# Patient Record
Sex: MALE | Race: WHITE | Employment: FULL TIME | URBAN - METROPOLITAN AREA
[De-identification: names, ages, dates, MRNs, and addresses within clinical notes are randomized per-mention and may not be internally consistent; named-entity substitution may affect disease eponyms.]

---

## 2024-05-03 ENCOUNTER — APPOINTMENT (OUTPATIENT)
Dept: PRIMARY CARE | Facility: CLINIC | Age: 49
End: 2024-05-03
Payer: COMMERCIAL

## 2024-05-09 ENCOUNTER — OFFICE VISIT (OUTPATIENT)
Dept: PRIMARY CARE | Facility: CLINIC | Age: 49
End: 2024-05-09
Payer: COMMERCIAL

## 2024-05-09 ENCOUNTER — LAB (OUTPATIENT)
Dept: LAB | Facility: LAB | Age: 49
End: 2024-05-09
Payer: COMMERCIAL

## 2024-05-09 VITALS
RESPIRATION RATE: 16 BRPM | SYSTOLIC BLOOD PRESSURE: 113 MMHG | HEART RATE: 73 BPM | WEIGHT: 224 LBS | HEIGHT: 68 IN | OXYGEN SATURATION: 97 % | DIASTOLIC BLOOD PRESSURE: 73 MMHG | BODY MASS INDEX: 33.95 KG/M2

## 2024-05-09 DIAGNOSIS — R73.09 ELEVATED GLUCOSE: ICD-10-CM

## 2024-05-09 DIAGNOSIS — Z11.59 NEED FOR HEPATITIS C SCREENING TEST: ICD-10-CM

## 2024-05-09 DIAGNOSIS — E66.09 CLASS 1 OBESITY DUE TO EXCESS CALORIES WITHOUT SERIOUS COMORBIDITY WITH BODY MASS INDEX (BMI) OF 34.0 TO 34.9 IN ADULT: ICD-10-CM

## 2024-05-09 DIAGNOSIS — H91.90 DECREASED HEARING, UNSPECIFIED LATERALITY: ICD-10-CM

## 2024-05-09 DIAGNOSIS — Z12.5 PROSTATE CANCER SCREENING: ICD-10-CM

## 2024-05-09 DIAGNOSIS — Z00.00 ANNUAL PHYSICAL EXAM: Primary | ICD-10-CM

## 2024-05-09 DIAGNOSIS — Z12.11 COLON CANCER SCREENING: ICD-10-CM

## 2024-05-09 DIAGNOSIS — F17.219 CIGARETTE NICOTINE DEPENDENCE WITH NICOTINE-INDUCED DISORDER: ICD-10-CM

## 2024-05-09 DIAGNOSIS — Z00.00 ANNUAL PHYSICAL EXAM: ICD-10-CM

## 2024-05-09 PROBLEM — E66.811 CLASS 1 OBESITY DUE TO EXCESS CALORIES WITHOUT SERIOUS COMORBIDITY WITH BODY MASS INDEX (BMI) OF 34.0 TO 34.9 IN ADULT: Status: ACTIVE | Noted: 2024-05-09

## 2024-05-09 LAB
ALT SERPL W P-5'-P-CCNC: 41 U/L (ref 10–52)
ANION GAP SERPL CALC-SCNC: 16 MMOL/L (ref 10–20)
AST SERPL W P-5'-P-CCNC: 22 U/L (ref 9–39)
BUN SERPL-MCNC: 13 MG/DL (ref 6–23)
CALCIUM SERPL-MCNC: 9.2 MG/DL (ref 8.6–10.6)
CHLORIDE SERPL-SCNC: 106 MMOL/L (ref 98–107)
CHOLEST SERPL-MCNC: 231 MG/DL (ref 0–199)
CHOLESTEROL/HDL RATIO: 4.2
CO2 SERPL-SCNC: 24 MMOL/L (ref 21–32)
CREAT SERPL-MCNC: 0.93 MG/DL (ref 0.5–1.3)
EGFRCR SERPLBLD CKD-EPI 2021: >90 ML/MIN/1.73M*2
ERYTHROCYTE [DISTWIDTH] IN BLOOD BY AUTOMATED COUNT: 13.3 % (ref 11.5–14.5)
GLUCOSE SERPL-MCNC: 107 MG/DL (ref 74–99)
HCT VFR BLD AUTO: 43.6 % (ref 41–52)
HCV AB SER QL: NONREACTIVE
HDLC SERPL-MCNC: 55.5 MG/DL
HGB BLD-MCNC: 15.3 G/DL (ref 13.5–17.5)
LDLC SERPL CALC-MCNC: 161 MG/DL
MCH RBC QN AUTO: 30.8 PG (ref 26–34)
MCHC RBC AUTO-ENTMCNC: 35.1 G/DL (ref 32–36)
MCV RBC AUTO: 88 FL (ref 80–100)
NON HDL CHOLESTEROL: 176 MG/DL (ref 0–149)
NRBC BLD-RTO: 0 /100 WBCS (ref 0–0)
PLATELET # BLD AUTO: 136 X10*3/UL (ref 150–450)
POTASSIUM SERPL-SCNC: 4.4 MMOL/L (ref 3.5–5.3)
PSA SERPL-MCNC: 0.67 NG/ML
RBC # BLD AUTO: 4.97 X10*6/UL (ref 4.5–5.9)
SODIUM SERPL-SCNC: 142 MMOL/L (ref 136–145)
TRIGL SERPL-MCNC: 74 MG/DL (ref 0–149)
TSH SERPL-ACNC: 1.47 MIU/L (ref 0.44–3.98)
VLDL: 15 MG/DL (ref 0–40)
WBC # BLD AUTO: 6.7 X10*3/UL (ref 4.4–11.3)

## 2024-05-09 PROCEDURE — 84153 ASSAY OF PSA TOTAL: CPT

## 2024-05-09 PROCEDURE — 83036 HEMOGLOBIN GLYCOSYLATED A1C: CPT

## 2024-05-09 PROCEDURE — 80061 LIPID PANEL: CPT

## 2024-05-09 PROCEDURE — 84450 TRANSFERASE (AST) (SGOT): CPT

## 2024-05-09 PROCEDURE — 99386 PREV VISIT NEW AGE 40-64: CPT | Performed by: FAMILY MEDICINE

## 2024-05-09 PROCEDURE — 84460 ALANINE AMINO (ALT) (SGPT): CPT

## 2024-05-09 PROCEDURE — 3008F BODY MASS INDEX DOCD: CPT | Performed by: FAMILY MEDICINE

## 2024-05-09 PROCEDURE — 86803 HEPATITIS C AB TEST: CPT

## 2024-05-09 PROCEDURE — 80048 BASIC METABOLIC PNL TOTAL CA: CPT

## 2024-05-09 PROCEDURE — 1036F TOBACCO NON-USER: CPT | Performed by: FAMILY MEDICINE

## 2024-05-09 PROCEDURE — 36415 COLL VENOUS BLD VENIPUNCTURE: CPT

## 2024-05-09 PROCEDURE — 84443 ASSAY THYROID STIM HORMONE: CPT

## 2024-05-09 PROCEDURE — 85027 COMPLETE CBC AUTOMATED: CPT

## 2024-05-09 RX ORDER — ESOMEPRAZOLE MAGNESIUM 40 MG/1
20 GRANULE, DELAYED RELEASE ORAL
COMMUNITY

## 2024-05-09 ASSESSMENT — PATIENT HEALTH QUESTIONNAIRE - PHQ9
1. LITTLE INTEREST OR PLEASURE IN DOING THINGS: NOT AT ALL
SUM OF ALL RESPONSES TO PHQ9 QUESTIONS 1 AND 2: 0
2. FEELING DOWN, DEPRESSED OR HOPELESS: NOT AT ALL

## 2024-05-09 NOTE — PROGRESS NOTES
"Subjective   Patient ID: Jaime Garcia is a 49 y.o. male who presents for Annual Exam.    HPI   Initial visit.  Has not seen a doctor in 16 yrs.    Patient's health is described as good.  Regular dental visits: No.  Dental hygiene (brushing/flossing) regularly performed: Yes.  Corrective lenses: Yes.  Vision problems: No.  Last eye exam within 1 year: No.  Hearing loss: Yes.  Requests audiology referral: Yes.  Immunizations up to date: No (declined tetanus, shingrix).  Healthy diet: Yes.  Regular exercise: No.  Trying to lose weight: Yes.  Requests nutrition/weight loss referral: No.  Sexually active: Yes.  Using contraception: No.  Requests STD screening: No.  Colon cancer screening up to date: No.  Lung cancer screening up to date: No.  Hepatitis C screening up to date: No.    Review of Systems  Occ fatigue since he quit smoking last year.  Will make appt for eval if persists.    Objective   /73   Pulse 73   Resp 16   Ht 1.727 m (5' 8\")   Wt 102 kg (224 lb)   SpO2 97%   BMI 34.06 kg/m²     Physical Exam  Constitutional:       General: He is not in acute distress.     Appearance: He is obese.   HENT:      Head: Normocephalic.      Right Ear: Tympanic membrane normal.      Left Ear: Tympanic membrane normal.      Mouth/Throat:      Pharynx: Oropharynx is clear. No oropharyngeal exudate or posterior oropharyngeal erythema.   Eyes:      Extraocular Movements: Extraocular movements intact.      Conjunctiva/sclera: Conjunctivae normal.      Pupils: Pupils are equal, round, and reactive to light.   Neck:      Thyroid: No thyromegaly.      Vascular: No carotid bruit.   Cardiovascular:      Rate and Rhythm: Normal rate and regular rhythm.      Heart sounds: Normal heart sounds. No murmur heard.     No friction rub. No gallop.   Pulmonary:      Effort: Pulmonary effort is normal.      Breath sounds: Normal breath sounds. No wheezing, rhonchi or rales.   Abdominal:      General: Bowel sounds are normal. There is " no distension.      Palpations: Abdomen is soft. There is no mass.      Tenderness: There is no abdominal tenderness. There is no guarding or rebound.   Genitourinary:     Comments: Declined prostate exam  Lymphadenopathy:      Cervical: No cervical adenopathy.   Skin:     Coloration: Skin is not jaundiced or pale.   Neurological:      General: No focal deficit present.      Mental Status: He is oriented to person, place, and time.   Psychiatric:         Mood and Affect: Mood normal.         Behavior: Behavior normal.     Assessment/Plan   Diagnoses and all orders for this visit:  Annual physical exam  -     CBC; Future  -     Basic Metabolic Panel; Future  -     Lipid Panel; Future  -     Aspartate Aminotransferase; Future  -     Alanine Aminotransferase; Future  Prostate cancer screening  -     Prostate Specific Antigen, Screen; Future  Colon cancer screening  -     Colonoscopy Screening; Average Risk Patient; Future  Need for hepatitis C screening test  -     Hepatitis C Antibody; Future  Cigarette nicotine dependence with nicotine-induced disorder  -     CT lung screening low dose; Future  Decreased hearing, unspecified laterality  -     Referral to Audiology; Future  Class 1 obesity due to excess calories without serious comorbidity with body mass index (BMI) of 34.0 to 34.9 in adult  -     TSH with reflex to Free T4 if abnormal; Future    Fasting labs.  Colonoscopy referral.  CT chest for lung cancer screening.  Referred to audiology as requested.  Recommend routine dental visits.   Recommend weight loss efforts (see [email or web address] for ideas).     F/U 1 year: Annual wellness visit.

## 2024-05-09 NOTE — PATIENT INSTRUCTIONS
Fasting labs.  Colonoscopy referral.  CT chest for lung cancer screening.  Referred to audiology as requested.  Recommend routine dental visits.   Recommend weight loss efforts (see [email or web address] for ideas).     F/U 1 year: Annual wellness visit.    Lab services: [address]  Hours: M-F 6:30a-6p, Sat 8a-12p  Phone: [phone], [address]

## 2024-05-12 DIAGNOSIS — R73.09 ELEVATED GLUCOSE: Primary | ICD-10-CM

## 2024-05-12 LAB
EST. AVERAGE GLUCOSE BLD GHB EST-MCNC: 108 MG/DL
HBA1C MFR BLD: 5.4 %

## 2024-05-13 DIAGNOSIS — Z12.11 SPECIAL SCREENING FOR MALIGNANT NEOPLASMS, COLON: ICD-10-CM

## 2024-05-13 RX ORDER — POLYETHYLENE GLYCOL 3350, SODIUM SULFATE ANHYDROUS, SODIUM BICARBONATE, SODIUM CHLORIDE, POTASSIUM CHLORIDE 236; 22.74; 6.74; 5.86; 2.97 G/4L; G/4L; G/4L; G/4L; G/4L
POWDER, FOR SOLUTION ORAL
Qty: 4000 ML | Refills: 0 | Status: SHIPPED | OUTPATIENT
Start: 2024-05-13

## 2024-05-24 ENCOUNTER — HOSPITAL ENCOUNTER (OUTPATIENT)
Dept: RADIOLOGY | Facility: CLINIC | Age: 49
Discharge: HOME | End: 2024-05-24
Payer: COMMERCIAL

## 2024-05-24 DIAGNOSIS — F17.219 CIGARETTE NICOTINE DEPENDENCE WITH NICOTINE-INDUCED DISORDER: ICD-10-CM

## 2024-05-24 PROCEDURE — 71271 CT THORAX LUNG CANCER SCR C-: CPT

## 2024-05-26 DIAGNOSIS — D69.6 DECREASED PLATELET COUNT (CMS-HCC): Primary | ICD-10-CM

## 2024-05-27 DIAGNOSIS — F17.219 CIGARETTE NICOTINE DEPENDENCE WITH NICOTINE-INDUCED DISORDER: ICD-10-CM

## 2024-05-27 DIAGNOSIS — R91.8 PULMONARY NODULES: Primary | ICD-10-CM

## 2024-06-05 ENCOUNTER — LAB (OUTPATIENT)
Dept: LAB | Facility: LAB | Age: 49
End: 2024-06-05
Payer: COMMERCIAL

## 2024-06-05 DIAGNOSIS — D69.6 DECREASED PLATELET COUNT (CMS-HCC): ICD-10-CM

## 2024-06-05 LAB
ERYTHROCYTE [DISTWIDTH] IN BLOOD BY AUTOMATED COUNT: 13.5 % (ref 11.5–14.5)
HCT VFR BLD AUTO: 43.3 % (ref 41–52)
HGB BLD-MCNC: 15.1 G/DL (ref 13.5–17.5)
MCH RBC QN AUTO: 30.5 PG (ref 26–34)
MCHC RBC AUTO-ENTMCNC: 34.9 G/DL (ref 32–36)
MCV RBC AUTO: 88 FL (ref 80–100)
NRBC BLD-RTO: 0 /100 WBCS (ref 0–0)
PLATELET # BLD AUTO: 115 X10*3/UL (ref 150–450)
RBC # BLD AUTO: 4.95 X10*6/UL (ref 4.5–5.9)
WBC # BLD AUTO: 8 X10*3/UL (ref 4.4–11.3)

## 2024-06-05 PROCEDURE — 85027 COMPLETE CBC AUTOMATED: CPT

## 2024-06-05 PROCEDURE — 36415 COLL VENOUS BLD VENIPUNCTURE: CPT

## 2024-06-08 DIAGNOSIS — D69.6 PLATELETS DECREASED (CMS-HCC): Primary | ICD-10-CM

## 2024-06-26 NOTE — PROGRESS NOTES
AUDIOLOGY ADULT AUDIOMETRIC EVALUATION      Name:  Jaime Garcia   :  1975  Age:  49 y.o.  Date of Evaluation:  2024    Time: 3132-4922    IMPRESSIONS     Moderately-severe mixed hearing loss rising to mild in both ears.  Word understanding in quiet and in noise is excellent in both ears.  DPOAE responses absent at all frequencies tested in both ears.  Tympanometry indicates normal middle ear pressure and tympanic membrane mobility in both ears.     Today's test results are hearing loss requiring medical/otologic and audiologic follow-up.     Amplification needs: Need for amplification will be reassessed following medical intervention.    RECOMMENDATIONS     Continue medical follow up with primary care provider and/or Ears Nose and Throat (ENT) provider as recommended.  Schedule an evaluation with an Ears Nose and Throat (ENT) provider to address bilateral mixed hearing loss and left tinnitus. For ENT scheduling, call [phone].   Return for audiologic evaluation in conjunction with medical management or annually (whichever is sooner) to monitor hearing sensitivity and assess middle ear status or sooner should concerns arise. The audiology department can be reached at [phone] to schedule an appointment.   Consider amplification pending medical clearance. Check insurance benefit for hearing aid benefits and in-network providers. To schedule a hearing aid consultation with [facility] audiology department, call [phone]. Patient was provided with a copy of today's audiogram.  Avoid exposure to loud sounds by moving away from the noise, turning down the volume, or wearing proper hearing protection correctly.  Consider use of good communication strategies. These include but are not limited to the following: get Jaime's attention before speaking to him, close the distance between Jaime and who is speaking, limit background noise, allow Jaime access to visual cues (i.e. facial  "expressions/mouth movements, pictures, written instructions, etc.). When in situations where background noise cannot be avoided, position yourself so that the background noise is to your back, and you communication partner is seated in front of you, ideally with a quiet area or wall behind them.   Continue use of tinnitus management options, which include but are not limited to the following: sound therapy (use of pleasant or calming sounds that diminish the presence of tinnitus); mindfulness, meditation, and breathing exercises; sleep hygiene; mental health evaluation and formal therapies; psychiatric management of psychopharmaceuticals; dental/orthodontia evaluation; dietary changes (reduction of sodium, caffeine, alcohol); lifestyle changes (exercise, etc.); use of hearing protection and avoidance of loud noise; and formal tinnitus therapies (Tinnitus Retraining Therapy/ TRT, Progressive Tinnitus Management, Tinnitus Activities Treatment, Cognitive Behavioral Therapy).    HISTORY     Reason for visit:  Mr. Garcia is seen today for an initial audiologic evaluation at the request of Michele Sahu MD due to concerns for change in hearing in the left ear greater than the right ear. History obtained from patient report and chart review.     Change in Hearing: yes in the left ear greater than the right ear. This has been a chronic issue for many years.   Difficult listening environments: Significant difficulty with conversations to his left, especially when he is not paying attention and there is background noise.  Tinnitus: yes in the left ear constant, bothersome, non-pulsatile, and described as static/\"whooshing\" sensation  Otalgia: denied  Aural Pressure/Fullness: denied  Recent Ear Infections/Illness: denied  Otorrhea: denied  Dizziness: yes, history of drop attack about 14 years ago, and reported being diagnosed with vertigo in the ED, and followed up with providers regarding vertigo for about a year with no " "issues. No current/recent dizziness.   History of Ear Surgeries: denied  History of Noise Exposure: yes, occupational and recreational noise exposure, and hearing protection was reportedly not worn in the past, but is currently worn around excessive noise  Family History of Hearing Loss: Denied  Hearing Aid Use: None  Other Significant History: denied  Falls within the last year: denied    EVALUATION     See scanned audiogram in \"Media\"     TEST RESULTS     Otoscopic Evaluation:  Right Ear: Ear canal clear with identifiable cone of light.  Left Ear: Ear canal clear with identifiable cone of light.    Tympanometry (226 Hz):  Right Ear: Type A, middle ear pressure and tympanic membrane compliance within normal limits.   Left Ear: Type A, middle ear pressure and tympanic membrane compliance within normal limits.     Acoustic Reflexes:   Right Ear: Responses absent 500-2000 Hz.   Left Ear: Responses absent 500-2000 Hz.     Distortion Product Otoacoustic Emissions:  Right Ear: Absent at all frequencies tested 0259-1113 Hz.  Left Ear:  Absent at all frequencies tested 0694-3311 Hz.  Present OAEs suggest normal or near cochlear outer hair cell function for corresponding frequency region(s). Absent OAEs with normal middle ear function can be consistent with some degree of hearing loss. Assessment of cochlear outer hair cell function may be impacted by outer or middle ear function.    Test technique:  Pure Tone Audiometry via insert earphones  Reliability:   good    Pure Tone Audiometry:    Right Ear: Moderately-severe mixed hearing loss for 125-250 Hz, rising to moderate for 500-4000 Hz, and to mild for 1645-6345 Hz. Air bone gaps of 15-40 dB HL present for 250-4000 Hz.  Left Ear: Moderately-severe mixed hearing loss for 125-250 Hz, rising to moderate for 500-6000 Hz, and to mild at 8000 Hz. Air bone gaps of 20-30 dB HL present for 250-1000, and 4000 Hz.    Speech Audiometry:   Right Ear:  Speech Reception Threshold (SRT) " was obtained at 50 dB HL. This is in good agreement with three frequency Pure Tone Average. Word Recognition scores were excellent (96%) in quiet when words were presented at 85 dB HL. These results are based on [facility] Auditory Test No.6 (NU-6) (N=25).   Left Ear:  Speech Reception Threshold (SRT) was obtained at 50 dB HL. This is in good agreement with three frequency Pure Tone Average. Word Recognition scores were excellent (100%) in quiet when words were presented at 85 dB HL. These results are based on [facility] Auditory Test No.6 (NU-6) (N=25).   Speech in Noise:   Right Ear:  Quick-SIN tested was administered at 85 dB HL, and testing revealed a signal to noise ratio (SNR) loss of 0.5, which is categorized as normal/near normal (0-3 dB), and patient may hear better than those with normal hearing are able to in noise.  Left Ear:  Quick-SIN tested was administered at 85 dB HL, and testing revealed a signal to noise ratio (SNR) loss of 0.5, which is categorized as normal/near normal (0-3 dB), and patient may hear better than those with normal hearing are able to in noise.     Comparison of today's results with previous test results: No previous results available.         PATIENT EDUCATION     Discussed results and recommendations with Mr. Garcia. Questions were addressed and the patient was encouraged to contact our department at [phone] should concerns arise.     PETR Erickson, CCC-A  Licensed Clinical Audiologist    Degree of   Hearing Sensitivity dB Range   Within Normal Limits (WNL) 0 - 20   Slight 25   Mild 26 - 40   Moderate 41 - 55   Moderately-Severe 56 - 70   Severe 71 - 90   Profound 91 +     Key   CHL Conductive Hearing Loss   ECV Ear Canal Volume   HA Hearing Aid   NIHL Noise-Induced Hearing Loss   PTA Pure Tone Average   SNHL Sensorineural Hearing Loss   TM Tympanic Membrane   WNL Within Normal Limits

## 2024-06-28 ENCOUNTER — APPOINTMENT (OUTPATIENT)
Dept: GASTROENTEROLOGY | Facility: EXTERNAL LOCATION | Age: 49
End: 2024-06-28
Payer: COMMERCIAL

## 2024-06-30 ENCOUNTER — APPOINTMENT (OUTPATIENT)
Dept: RADIOLOGY | Facility: HOSPITAL | Age: 49
End: 2024-06-30
Payer: COMMERCIAL

## 2024-06-30 ENCOUNTER — HOSPITAL ENCOUNTER (EMERGENCY)
Facility: HOSPITAL | Age: 49
Discharge: HOME | End: 2024-06-30
Payer: COMMERCIAL

## 2024-06-30 VITALS
HEIGHT: 68 IN | HEART RATE: 69 BPM | RESPIRATION RATE: 18 BRPM | BODY MASS INDEX: 33.95 KG/M2 | SYSTOLIC BLOOD PRESSURE: 138 MMHG | DIASTOLIC BLOOD PRESSURE: 92 MMHG | WEIGHT: 224 LBS | TEMPERATURE: 97.3 F | OXYGEN SATURATION: 98 %

## 2024-06-30 DIAGNOSIS — L03.011 PARONYCHIA OF FINGER OF RIGHT HAND: Primary | ICD-10-CM

## 2024-06-30 PROCEDURE — 99283 EMERGENCY DEPT VISIT LOW MDM: CPT | Mod: 25

## 2024-06-30 PROCEDURE — 26010 DRAINAGE OF FINGER ABSCESS: CPT | Mod: F9

## 2024-06-30 PROCEDURE — 73140 X-RAY EXAM OF FINGER(S): CPT | Mod: RT

## 2024-06-30 PROCEDURE — 73140 X-RAY EXAM OF FINGER(S): CPT | Mod: RIGHT SIDE | Performed by: RADIOLOGY

## 2024-06-30 ASSESSMENT — COLUMBIA-SUICIDE SEVERITY RATING SCALE - C-SSRS
6. HAVE YOU EVER DONE ANYTHING, STARTED TO DO ANYTHING, OR PREPARED TO DO ANYTHING TO END YOUR LIFE?: NO
1. IN THE PAST MONTH, HAVE YOU WISHED YOU WERE DEAD OR WISHED YOU COULD GO TO SLEEP AND NOT WAKE UP?: NO
2. HAVE YOU ACTUALLY HAD ANY THOUGHTS OF KILLING YOURSELF?: NO

## 2024-06-30 ASSESSMENT — PAIN DESCRIPTION - PAIN TYPE: TYPE: ACUTE PAIN

## 2024-06-30 ASSESSMENT — PAIN DESCRIPTION - LOCATION: LOCATION: FINGER (COMMENT WHICH ONE)

## 2024-06-30 ASSESSMENT — PAIN SCALES - GENERAL: PAINLEVEL_OUTOF10: 5 - MODERATE PAIN

## 2024-06-30 ASSESSMENT — PAIN - FUNCTIONAL ASSESSMENT: PAIN_FUNCTIONAL_ASSESSMENT: 0-10

## 2024-07-01 ENCOUNTER — CLINICAL SUPPORT (OUTPATIENT)
Dept: AUDIOLOGY | Facility: CLINIC | Age: 49
End: 2024-07-01
Payer: COMMERCIAL

## 2024-07-01 DIAGNOSIS — H90.6 MIXED CONDUCTIVE AND SENSORINEURAL HEARING LOSS OF BOTH EARS: Primary | ICD-10-CM

## 2024-07-01 DIAGNOSIS — H91.93 DECREASED HEARING OF BOTH EARS: Primary | ICD-10-CM

## 2024-07-01 DIAGNOSIS — H93.12 SUBJECTIVE TINNITUS OF LEFT EAR: ICD-10-CM

## 2024-07-01 PROCEDURE — 92567 TYMPANOMETRY: CPT

## 2024-07-01 PROCEDURE — 92557 COMPREHENSIVE HEARING TEST: CPT

## 2024-07-01 NOTE — Clinical Note
Hello! This patient was seen for a hearing test at the request of his PCP. Results show bilateral mixed hearing loss, and asymmetric tinnitus (left ear only). Can he be scheduled with an ENT provider to address this? Thank you!!

## 2024-07-01 NOTE — PATIENT INSTRUCTIONS
IMPRESSIONS      Moderately-severe mixed hearing loss rising to mild in both ears.  Word understanding in quiet and in noise is excellent in both ears.  DPOAE responses absent at all frequencies tested in both ears.  Tympanometry indicates normal middle ear pressure and tympanic membrane mobility in both ears.      Today's test results are hearing loss requiring medical/otologic and audiologic follow-up.      Amplification needs: Need for amplification will be reassessed following medical intervention.     RECOMMENDATIONS      Continue medical follow up with primary care provider and/or Ears Nose and Throat (ENT) provider as recommended.  Schedule an evaluation with an Ears Nose and Throat (ENT) provider to address bilateral mixed hearing loss and left tinnitus. For ENT scheduling, call [phone].   Return for audiologic evaluation in conjunction with medical management or annually (whichever is sooner) to monitor hearing sensitivity and assess middle ear status or sooner should concerns arise. The audiology department can be reached at [phone] to schedule an appointment.   Consider amplification pending medical clearance. Check insurance benefit for hearing aid benefits and in-network providers. To schedule a hearing aid consultation with [facility] audiology department, call [phone]. Patient was provided with a copy of today's audiogram.  Avoid exposure to loud sounds by moving away from the noise, turning down the volume, or wearing proper hearing protection correctly.  Consider use of good communication strategies. These include but are not limited to the following: get Jaime's attention before speaking to him, close the distance between Jaime and who is speaking, limit background noise, allow Jaime access to visual cues (i.e. facial expressions/mouth movements, pictures, written instructions, etc.). When in situations where background noise cannot be avoided, position yourself so that  the background noise is to your back, and you communication partner is seated in front of you, ideally with a quiet area or wall behind them.   Continue use of tinnitus management options, which include but are not limited to the following: sound therapy (use of pleasant or calming sounds that diminish the presence of tinnitus); mindfulness, meditation, and breathing exercises; sleep hygiene; mental health evaluation and formal therapies; psychiatric management of psychopharmaceuticals; dental/orthodontia evaluation; dietary changes (reduction of sodium, caffeine, alcohol); lifestyle changes (exercise, etc.); use of hearing protection and avoidance of loud noise; and formal tinnitus therapies (Tinnitus Retraining Therapy/ TRT, Progressive Tinnitus Management, Tinnitus Activities Treatment, Cognitive Behavioral Therapy).

## 2024-07-01 NOTE — ED PROVIDER NOTES
HPI   Chief Complaint   Patient presents with    Wound Check       49y old male with a complaint of paronchia of the R hand. Has been ongoing for a few days was initially seen at urgent care placed on Keflex.  They did not I&D it because he has a history of low platelets and felt it would feel without I&D however it has gotten bigger and more painful.  The initial injury happened when it was stuck in a car door                          [place] Coma Scale Score: 15                     Patient History   No past medical history on file.  Past Surgical History:   Procedure Laterality Date    APPENDECTOMY       Family History   Problem Relation Name Age of Onset    Diverticulitis Father      Diabetes Paternal Grandmother       Social History     Tobacco Use    Smoking status: Former     Average packs/day: 0.8 packs/day for 32.2 years (24.1 ttl pk-yrs)     Types: Cigarettes     Start date: 1991    Smokeless tobacco: Never   Substance Use Topics    Alcohol use: Yes    Drug use: Yes     Types: Marijuana       Physical Exam   ED Triage Vitals [06/30/24 2029]   Temperature Heart Rate Respirations BP   36.3 °C (97.3 °F) 69 18 (!) 138/92      Pulse Ox Temp Source Heart Rate Source Patient Position   98 % Temporal Monitor Sitting      BP Location FiO2 (%)     Left arm --       Physical Exam  Vitals reviewed.   Constitutional:       General: He is not in acute distress.     Appearance: Normal appearance. He is normal weight. He is not ill-appearing, toxic-appearing or diaphoretic.   HENT:      Head: Normocephalic and atraumatic.      Right Ear: External ear normal.      Left Ear: External ear normal.      Nose: Nose normal.   Eyes:      Extraocular Movements: Extraocular movements intact.      Conjunctiva/sclera: Conjunctivae normal.      Pupils: Pupils are equal, round, and reactive to light.   Pulmonary:      Effort: Pulmonary effort is normal. No respiratory distress.      Breath sounds: No stridor.   Abdominal:      General:  There is no distension.   Musculoskeletal:         General: Tenderness present. No swelling or deformity.        Hands:    Skin:     Capillary Refill: Capillary refill takes less than 2 seconds.      Findings: No rash.   Neurological:      General: No focal deficit present.      Mental Status: He is alert and oriented to person, place, and time. Mental status is at baseline.   Psychiatric:         Mood and Affect: Mood normal.         Behavior: Behavior normal.         Thought Content: Thought content normal.         Judgment: Judgment normal.         ED Course & MDM   Diagnoses as of 06/30/24 2324   Paronychia of finger of right hand       Medical Decision Making  Differential of cellulitis versus paronychia versus felOn.    I&D performed after warm soaking encouraged to continue antibiotic    Referred for follow-up with hand return for any worse concerning symptoms            Procedure  Nail Care    Performed by: Alejandro Perez PA-C  Authorized by: Alejandro Perez PA-C    Consent:     Consent obtained:  Verbal    Consent given by:  Patient    Risks discussed:  Bleeding, pain, infection and permanent nail deformity    Alternatives discussed:  No treatment and referral  Universal protocol:     Procedure explained and questions answered to patient or proxy's satisfaction: yes      Immediately prior to procedure, a time out was called: yes      Patient identity confirmed:  Verbally with patient  Pre-procedure details:     Skin preparation:  Povidone-iodine  Procedure details:     Location:  Hand    Hand location:  R small finger  Anesthesia:     Anesthesia method:  None  Comments:      I&D performed with 11 blade scalpel significant purulent material drained patient tolerated well.       Alejandro Perez PA-C  06/30/24 5235       Alejandro Perez PA-C  06/30/24 9134

## 2024-07-09 ENCOUNTER — OFFICE VISIT (OUTPATIENT)
Dept: HEMATOLOGY/ONCOLOGY | Facility: CLINIC | Age: 49
End: 2024-07-09
Payer: COMMERCIAL

## 2024-07-09 VITALS
SYSTOLIC BLOOD PRESSURE: 119 MMHG | HEART RATE: 101 BPM | BODY MASS INDEX: 33.7 KG/M2 | RESPIRATION RATE: 16 BRPM | WEIGHT: 222.33 LBS | DIASTOLIC BLOOD PRESSURE: 70 MMHG | HEIGHT: 68 IN | TEMPERATURE: 97.6 F | OXYGEN SATURATION: 97 %

## 2024-07-09 DIAGNOSIS — D69.6 PLATELETS DECREASED (CMS-HCC): ICD-10-CM

## 2024-07-09 LAB
ALBUMIN SERPL BCP-MCNC: 4.4 G/DL (ref 3.4–5)
ALP SERPL-CCNC: 75 U/L (ref 33–120)
ALT SERPL W P-5'-P-CCNC: 45 U/L (ref 10–52)
ANION GAP SERPL CALC-SCNC: 14 MMOL/L (ref 10–20)
AST SERPL W P-5'-P-CCNC: 22 U/L (ref 9–39)
BASOPHILS # BLD AUTO: 0.03 X10*3/UL (ref 0–0.1)
BASOPHILS NFR BLD AUTO: 0.4 %
BILIRUB SERPL-MCNC: 0.5 MG/DL (ref 0–1.2)
BUN SERPL-MCNC: 14 MG/DL (ref 6–23)
CALCIUM SERPL-MCNC: 9.4 MG/DL (ref 8.6–10.3)
CHLORIDE SERPL-SCNC: 103 MMOL/L (ref 98–107)
CO2 SERPL-SCNC: 24 MMOL/L (ref 21–32)
CREAT SERPL-MCNC: 0.99 MG/DL (ref 0.5–1.3)
EGFRCR SERPLBLD CKD-EPI 2021: >90 ML/MIN/1.73M*2
EOSINOPHIL # BLD AUTO: 0.1 X10*3/UL (ref 0–0.7)
EOSINOPHIL NFR BLD AUTO: 1.3 %
ERYTHROCYTE [DISTWIDTH] IN BLOOD BY AUTOMATED COUNT: 12.8 % (ref 11.5–14.5)
GLUCOSE SERPL-MCNC: 167 MG/DL (ref 74–99)
HCT VFR BLD AUTO: 41.8 % (ref 41–52)
HGB BLD-MCNC: 14.9 G/DL (ref 13.5–17.5)
HIV 1+2 AB+HIV1 P24 AG SERPL QL IA: NONREACTIVE
IMM GRANULOCYTES # BLD AUTO: 0.03 X10*3/UL (ref 0–0.7)
IMM GRANULOCYTES NFR BLD AUTO: 0.4 % (ref 0–0.9)
LYMPHOCYTES # BLD AUTO: 0.87 X10*3/UL (ref 1.2–4.8)
LYMPHOCYTES NFR BLD AUTO: 10.9 %
MCH RBC QN AUTO: 30.2 PG (ref 26–34)
MCHC RBC AUTO-ENTMCNC: 35.6 G/DL (ref 32–36)
MCV RBC AUTO: 85 FL (ref 80–100)
MONOCYTES # BLD AUTO: 0.47 X10*3/UL (ref 0.1–1)
MONOCYTES NFR BLD AUTO: 5.9 %
NEUTROPHILS # BLD AUTO: 6.47 X10*3/UL (ref 1.2–7.7)
NEUTROPHILS NFR BLD AUTO: 81.1 %
NRBC BLD-RTO: 0 /100 WBCS (ref 0–0)
PLATELET # BLD AUTO: 140 X10*3/UL (ref 150–450)
POTASSIUM SERPL-SCNC: 3.9 MMOL/L (ref 3.5–5.3)
PROT SERPL-MCNC: 7.1 G/DL (ref 6.4–8.2)
RBC # BLD AUTO: 4.94 X10*6/UL (ref 4.5–5.9)
SODIUM SERPL-SCNC: 137 MMOL/L (ref 136–145)
WBC # BLD AUTO: 8 X10*3/UL (ref 4.4–11.3)

## 2024-07-09 PROCEDURE — 83921 ORGANIC ACID SINGLE QUANT: CPT | Performed by: INTERNAL MEDICINE

## 2024-07-09 PROCEDURE — 99204 OFFICE O/P NEW MOD 45 MIN: CPT | Performed by: INTERNAL MEDICINE

## 2024-07-09 PROCEDURE — 82746 ASSAY OF FOLIC ACID SERUM: CPT | Performed by: INTERNAL MEDICINE

## 2024-07-09 PROCEDURE — 83540 ASSAY OF IRON: CPT | Performed by: INTERNAL MEDICINE

## 2024-07-09 PROCEDURE — 1036F TOBACCO NON-USER: CPT | Performed by: INTERNAL MEDICINE

## 2024-07-09 PROCEDURE — 86706 HEP B SURFACE ANTIBODY: CPT | Performed by: INTERNAL MEDICINE

## 2024-07-09 PROCEDURE — 87389 HIV-1 AG W/HIV-1&-2 AB AG IA: CPT | Mod: WESLAB | Performed by: INTERNAL MEDICINE

## 2024-07-09 PROCEDURE — 86803 HEPATITIS C AB TEST: CPT | Performed by: INTERNAL MEDICINE

## 2024-07-09 PROCEDURE — 86704 HEP B CORE ANTIBODY TOTAL: CPT | Performed by: INTERNAL MEDICINE

## 2024-07-09 PROCEDURE — 82728 ASSAY OF FERRITIN: CPT | Performed by: INTERNAL MEDICINE

## 2024-07-09 PROCEDURE — 99214 OFFICE O/P EST MOD 30 MIN: CPT | Performed by: INTERNAL MEDICINE

## 2024-07-09 PROCEDURE — 82607 VITAMIN B-12: CPT | Performed by: INTERNAL MEDICINE

## 2024-07-09 PROCEDURE — 85025 COMPLETE CBC W/AUTO DIFF WBC: CPT | Performed by: INTERNAL MEDICINE

## 2024-07-09 PROCEDURE — 80053 COMPREHEN METABOLIC PANEL: CPT | Performed by: INTERNAL MEDICINE

## 2024-07-09 PROCEDURE — 3008F BODY MASS INDEX DOCD: CPT | Performed by: INTERNAL MEDICINE

## 2024-07-09 PROCEDURE — 87340 HEPATITIS B SURFACE AG IA: CPT | Performed by: INTERNAL MEDICINE

## 2024-07-09 ASSESSMENT — PATIENT HEALTH QUESTIONNAIRE - PHQ9
2. FEELING DOWN, DEPRESSED OR HOPELESS: NOT AT ALL
SUM OF ALL RESPONSES TO PHQ9 QUESTIONS 1 AND 2: 0
1. LITTLE INTEREST OR PLEASURE IN DOING THINGS: NOT AT ALL

## 2024-07-09 ASSESSMENT — ENCOUNTER SYMPTOMS
OCCASIONAL FEELINGS OF UNSTEADINESS: 0
LOSS OF SENSATION IN FEET: 0
DEPRESSION: 0

## 2024-07-09 ASSESSMENT — PAIN SCALES - GENERAL: PAINLEVEL: 0-NO PAIN

## 2024-07-09 NOTE — PATIENT INSTRUCTIONS
An abdominal ultrasound has been ordered. This can be done at [facility] or any other  location that would be most convenient for you.     Telehealth follow up visit in 4 weeks with Dr. Hernandez.

## 2024-07-09 NOTE — PROGRESS NOTES
Patient ID: Jaime Garcia is a 49 y.o. male.  Referring Physician: Michele Sahu MD  [address]  [place] [address]  Primary Care Provider: Michele Sahu MD  Referral Reason:     Subjective: thrombocytopenia      Heme/Onc History:  Has not seen MD for 16 years. Has 30 PY smoking history.    Had CT lung ca screening in 05/24: sub cnm nodule    Denies any bruising, bleeding. He drinks 12 or more beers 2-3 times weekly.        Past Medical History: No past medical history on file.  Social History:   Social History     Socioeconomic History    Marital status:      Spouse name: Not on file    Number of children: Not on file    Years of education: Not on file    Highest education level: Not on file   Occupational History    Not on file   Tobacco Use    Smoking status: Former     Average packs/day: 0.8 packs/day for 32.2 years (24.1 ttl pk-yrs)     Types: Cigarettes     Start date: 1991    Smokeless tobacco: Never   Vaping Use    Vaping status: Never Used   Substance and Sexual Activity    Alcohol use: Yes    Drug use: Yes     Types: Marijuana    Sexual activity: Not on file   Other Topics Concern    Not on file   Social History Narrative    Not on file     Social Determinants of Health     Financial Resource Strain: Not on file   Food Insecurity: Not on file   Transportation Needs: Not on file   Physical Activity: Not on file   Stress: Not on file   Social Connections: Not on file   Intimate Partner Violence: Not on file   Housing Stability: Not on file     Surgical History:   Past Surgical History:   Procedure Laterality Date    APPENDECTOMY       Family History:   Family History   Problem Relation Name Age of Onset    Diverticulitis Father      Diabetes Paternal Grandmother        reports that he has quit smoking. His smoking use included cigarettes. He started smoking about 33 years ago. He has a 24.1 pack-year smoking history. He has never used smokeless  "tobacco.  Oncology Family history: Cancer-related family history is not on file.    Review Of Systems:  As stated per in HPI; otherwise all other 12 point ROS are negative    Physical Exam:  /70 (BP Location: Left arm, Patient Position: Sitting, BP Cuff Size: Adult long)   Pulse 101   Temp 36.4 °C (97.6 °F) (Temporal)   Resp 16   Ht (S) 1.716 m (5' 7.56\")   Wt 101 kg (222 lb 5.3 oz)   SpO2 97%   BMI 34.25 kg/m²   BSA: 2.19 meters squared  General: awake/alert/oriented x3, no distress, alert and cooperative  Head: Short hair fully covering scalp. Symmetric facial expressions  Eyes: PERRL, EOMI, clear sclera, eyebrows present.  Ears/Nose/Mouth/Throat:  Oral mucous membranes moist. No oral ulcers. No palpable pre/post-auricular lymph nodes  Neck: No palpable cervical chain lymph nodes  Respiratory: unlabored breathing on room air, good chest expansion, thorax symmetric  Cardio: Regular rate and rhythm, normal S1 and S2, radial pulses symmetric  GI: Nondistended, soft, non-tender abdomen  Musculoskeletal: Normal muscle bulk and tone, ROM intact, no joint swelling.  Rises from chair and walks unassisted.  Extremities: No ankle swelling, no arm or leg wounds  Neuro: Alert, cognition intact, speech normal. Facial expressions symmetric.  No motor deficits noted. Sensation intact to touch and hot/cold.   Able to stand from seated position unassisted and walks around the room unassisted.  Psychological: Appropriate mood and behavior.  Skin: Warm and dry, no lesions, no rashes    Results:  Diagnostic Results   Lab Results   Component Value Date    WBC 8.0 06/05/2024    HGB 15.1 06/05/2024    HCT 43.3 06/05/2024    MCV 88 06/05/2024     (L) 06/05/2024     Lab Results   Component Value Date    CALCIUM 9.2 05/09/2024     05/09/2024    K 4.4 05/09/2024    CO2 24 05/09/2024     05/09/2024    BUN 13 05/09/2024    CREATININE 0.93 05/09/2024    ALT 41 05/09/2024    AST 22 05/09/2024       Current " Outpatient Medications:     esomeprazole (NexIUM) 40 mg packet, Take 20 mg by mouth once daily in the morning. Take before meals., Disp: , Rfl:     polyethylene glycol-electrolytes (Golytely) 420 gram solution, Begin drinking first half of prep 6pm the night before procedure. Start second half 5 hours before procedure time., Disp: 4000 mL, Rfl: 0     Radiology:    Pathology:    Assessment/Plan:  ? Thrombocytopenia:    He drinks 12 or more beers 2-3 times weekly. Abd US ordered to rule out HSM.    No other cytopenias. Likely benign.     2- Lung cancer screening: sub cnm nodule detected in 05/24. His first CT chest. Quit smoking a year ago.    Recommended to cut back on ETOH    Televisit in 4 weeks    Diagnoses and all orders for this visit:  Platelets decreased (CMS-HCC)  -     Referral to Hematology and Oncology       Performance Status: Asymptomatic    I spent more than 30 minutes for the patient today, including face-to-face conversation, pre-visit preparation, post-visit orders, and others.   David Guevara MD

## 2024-07-10 LAB
FERRITIN SERPL-MCNC: 127 NG/ML (ref 20–300)
FOLATE SERPL-MCNC: 10.4 NG/ML
HBV CORE AB SER QL: NONREACTIVE
HBV SURFACE AB SER-ACNC: <3.1 MIU/ML
HBV SURFACE AG SERPL QL IA: NONREACTIVE
HCV AB SER QL: NONREACTIVE
IRON SATN MFR SERPL: 25 % (ref 25–45)
IRON SERPL-MCNC: 93 UG/DL (ref 35–150)
TIBC SERPL-MCNC: 368 UG/DL (ref 240–445)
UIBC SERPL-MCNC: 275 UG/DL (ref 110–370)
VIT B12 SERPL-MCNC: 324 PG/ML (ref 211–911)

## 2024-07-14 LAB — METHYLMALONATE SERPL-SCNC: 0.22 UMOL/L (ref 0–0.4)

## 2024-07-15 ENCOUNTER — TELEPHONE (OUTPATIENT)
Dept: HEMATOLOGY/ONCOLOGY | Facility: CLINIC | Age: 49
End: 2024-07-15

## 2024-07-15 ENCOUNTER — OFFICE VISIT (OUTPATIENT)
Dept: ORTHOPEDIC SURGERY | Facility: CLINIC | Age: 49
End: 2024-07-15
Payer: COMMERCIAL

## 2024-07-15 DIAGNOSIS — L03.011 PARONYCHIA OF RIGHT LITTLE FINGER: Primary | ICD-10-CM

## 2024-07-15 PROCEDURE — 99204 OFFICE O/P NEW MOD 45 MIN: CPT | Performed by: ORTHOPAEDIC SURGERY

## 2024-07-15 PROCEDURE — 1036F TOBACCO NON-USER: CPT | Performed by: ORTHOPAEDIC SURGERY

## 2024-07-15 PROCEDURE — 99214 OFFICE O/P EST MOD 30 MIN: CPT | Performed by: ORTHOPAEDIC SURGERY

## 2024-07-15 RX ORDER — SULFAMETHOXAZOLE AND TRIMETHOPRIM 800; 160 MG/1; MG/1
1 TABLET ORAL 2 TIMES DAILY
Qty: 20 TABLET | Refills: 0 | Status: SHIPPED | OUTPATIENT
Start: 2024-07-15 | End: 2024-07-25

## 2024-07-15 NOTE — LETTER
July 27, 2024     Michele Sahu MD  [address] [facility], [address]  [facility] [address]    Patient: Jaime Garcia   YOB: 1975   Date of Visit: 7/15/2024       Dear Dr. Michele Sahu MD:    Thank you for referring Jaime Garcia to me for evaluation. Below are my notes for this consultation.  If you have questions, please do not hesitate to call me. I look forward to following your patient along with you.       Sincerely,     Leroy Yoder MD      CC: No Recipients  ______________________________________________________________________________________    CHIEF COMPLAINT         Right small finger pain    ASSESSMENT + PLAN    Right small finger paronychia, now cellulitis    I reviewed the nature of the condition and the expected time course of symptoms resolution from here.  The persistent and worsening redness is concerning.  I do not appreciate any reaccumulation of pus.  We discussed options for management and agreed on a course of Bactrim for 10 days.  Take these as directed until they are gone.  Watch for the warning signs of recurrent or worsening infection that I reviewed, and contact my office if you should notice any of these.  The prescription was transmitted to your pharmacy of choice.    Follow-up with me with any additional concerns.        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  This began on June 24 when it was caught in a closing car door.  He was seen at a local urgent care where there was concern for infection and he was started on Keflex.  They did not do a formal I&D as they were concerned about his low platelet count (115).  He had worsening of redness and swelling and was seen at [facility] on June 30, where an I&D was done, recovering some pus.  He completed his course of antibiotic and was doing better.  He reports that he is now  "having worsening of the redness and discomfort, though not the swelling of the finger.  He has felt constitutional well through this with no fevers or chills.  No numbness or tingling.  No similar problems in other digits.    He is not diabetic or hypothyroid.  He stopped smoking a year ago.  He still drinks \"a lot\".        REVIEW OF SYSTEMS       A 30-item multi-system Review Of Systems was obtained on today's intake form.  This was reviewed with the patient and is correct.  The pertinent positives and negatives are listed above.  The form has been scanned separately into the medical record.      PHYSICAL EXAM    Constitutional:    Appears stated age. Well-developed and well-nourished obese male in no acute distress.  Psychiatric:         Pleasant normal mood and affect. Behavior is appropriate for the situation.   Head:                   Normocephalic and atraumatic.  Eyes:                    Pupils are equal and round.  Cardiovascular:  2+ radial and ulnar pulses. Fingers well-perfused.  Respiratory:        Effort normal. No respiratory distress. Speaking in complete sentences.  Neurologic:       Alert and oriented to person, place, and time.  Skin:                Skin is intact, warm and dry.  Hematologic / Lymphatic:    No lymphedema or lymphangitis.    Extremities / Musculoskeletal:                      Skin of right small finger and hand is intact with no ecchymosis.  There is a little swelling of the distal segment, especially ulnarly but no fluctuance or expressible fluid.  The drainage site is healing normally.  Normal fingernail.  No tenderness over the flexor sheath.  Intact flexion extension.  There is erythema to the level of the middle phalanx neck dorsally but minimal volar.  Sensation intact to light touch in all distributions.  Capillary refill less than 2 seconds.      IMAGING / LABS / EMGs           X-rays right small finger from June 30 at [facility] were independently interpreted by me today and " confirm the soft tissue swelling with no calcifications.  Joints are concentric and well-preserved.  No fractures or radiopaque foreign bodies.      No past medical history on file.    Medication Documentation Review Audit       Reviewed by Michele Sahu MD (Physician) on 05/09/24 at 0931      Medication Order Taking? Sig Documenting Provider Last Dose Status   esomeprazole (NexIUM) 40 mg packet [id number]  Take 20 mg by mouth once daily in the morning. Take before meals. Historical Provider, MD  Active                    No Known Allergies    Social History     Socioeconomic History   • Marital status:      Spouse name: Not on file   • Number of children: Not on file   • Years of education: Not on file   • Highest education level: Not on file   Occupational History   • Not on file   Tobacco Use   • Smoking status: Former     Average packs/day: 0.8 packs/day for 32.2 years (24.1 ttl pk-yrs)     Types: Cigarettes     Start date: 1991   • Smokeless tobacco: Never   Vaping Use   • Vaping status: Never Used   Substance and Sexual Activity   • Alcohol use: Yes   • Drug use: Yes     Types: Marijuana   • Sexual activity: Not on file   Other Topics Concern   • Not on file   Social History Narrative   • Not on file     Social Determinants of Health     Financial Resource Strain: Not on file   Food Insecurity: Not on file   Transportation Needs: Not on file   Physical Activity: Not on file   Stress: Not on file   Social Connections: Not on file   Intimate Partner Violence: Not on file   Housing Stability: Not on file       Past Surgical History:   Procedure Laterality Date   • APPENDECTOMY           Electronically Signed      JESSIE Yoder MD      Orthopaedic Hand Surgery      [phone]

## 2024-07-15 NOTE — PROGRESS NOTES
"CHIEF COMPLAINT         Right small finger pain    ASSESSMENT + PLAN    Right small finger paronychia, now cellulitis    I reviewed the nature of the condition and the expected time course of symptoms resolution from here.  The persistent and worsening redness is concerning.  I do not appreciate any reaccumulation of pus.  We discussed options for management and agreed on a course of Bactrim for 10 days.  Take these as directed until they are gone.  Watch for the warning signs of recurrent or worsening infection that I reviewed, and contact my office if you should notice any of these.  The prescription was transmitted to your pharmacy of choice.    Follow-up with me with any additional concerns.        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  This began on June 24 when it was caught in a closing car door.  He was seen at a local urgent care where there was concern for infection and he was started on Keflex.  They did not do a formal I&D as they were concerned about his low platelet count (115).  He had worsening of redness and swelling and was seen at [facility] on June 30, where an I&D was done, recovering some pus.  He completed his course of antibiotic and was doing better.  He reports that he is now having worsening of the redness and discomfort, though not the swelling of the finger.  He has felt constitutional well through this with no fevers or chills.  No numbness or tingling.  No similar problems in other digits.    He is not diabetic or hypothyroid.  He stopped smoking a year ago.  He still drinks \"a lot\".        REVIEW OF SYSTEMS       A 30-item multi-system Review Of Systems was obtained on today's intake form.  This was reviewed with the patient and is correct.  The pertinent positives and negatives are listed above.  The form has been scanned separately into the medical " record.      PHYSICAL EXAM    Constitutional:    Appears stated age. Well-developed and well-nourished obese male in no acute distress.  Psychiatric:         Pleasant normal mood and affect. Behavior is appropriate for the situation.   Head:                   Normocephalic and atraumatic.  Eyes:                    Pupils are equal and round.  Cardiovascular:  2+ radial and ulnar pulses. Fingers well-perfused.  Respiratory:        Effort normal. No respiratory distress. Speaking in complete sentences.  Neurologic:       Alert and oriented to person, place, and time.  Skin:                Skin is intact, warm and dry.  Hematologic / Lymphatic:    No lymphedema or lymphangitis.    Extremities / Musculoskeletal:                      Skin of right small finger and hand is intact with no ecchymosis.  There is a little swelling of the distal segment, especially ulnarly but no fluctuance or expressible fluid.  The drainage site is healing normally.  Normal fingernail.  No tenderness over the flexor sheath.  Intact flexion extension.  There is erythema to the level of the middle phalanx neck dorsally but minimal volar.  Sensation intact to light touch in all distributions.  Capillary refill less than 2 seconds.      IMAGING / LABS / EMGs           X-rays right small finger from June 30 at [facility] were independently interpreted by me today and confirm the soft tissue swelling with no calcifications.  Joints are concentric and well-preserved.  No fractures or radiopaque foreign bodies.      No past medical history on file.    Medication Documentation Review Audit       Reviewed by Michele Sahu MD (Physician) on 05/09/24 at 0931      Medication Order Taking? Sig Documenting Provider Last Dose Status   esomeprazole (NexIUM) 40 mg packet [id number]  Take 20 mg by mouth once daily in the morning. Take before meals. Historical Provider, MD  Active                    No Known Allergies    Social History     Socioeconomic History     Marital status:      Spouse name: Not on file    Number of children: Not on file    Years of education: Not on file    Highest education level: Not on file   Occupational History    Not on file   Tobacco Use    Smoking status: Former     Average packs/day: 0.8 packs/day for 32.2 years (24.1 ttl pk-yrs)     Types: Cigarettes     Start date: 1991    Smokeless tobacco: Never   Vaping Use    Vaping status: Never Used   Substance and Sexual Activity    Alcohol use: Yes    Drug use: Yes     Types: Marijuana    Sexual activity: Not on file   Other Topics Concern    Not on file   Social History Narrative    Not on file     Social Determinants of Health     Financial Resource Strain: Not on file   Food Insecurity: Not on file   Transportation Needs: Not on file   Physical Activity: Not on file   Stress: Not on file   Social Connections: Not on file   Intimate Partner Violence: Not on file   Housing Stability: Not on file       Past Surgical History:   Procedure Laterality Date    APPENDECTOMY           Electronically Signed      JESSIE Yoder MD      Orthopaedic Hand Surgery      [phone]

## 2024-07-22 ENCOUNTER — APPOINTMENT (OUTPATIENT)
Dept: OTOLARYNGOLOGY | Facility: CLINIC | Age: 49
End: 2024-07-22
Payer: COMMERCIAL

## 2024-07-22 ENCOUNTER — HOSPITAL ENCOUNTER (OUTPATIENT)
Dept: RADIOLOGY | Facility: HOSPITAL | Age: 49
Discharge: HOME | End: 2024-07-22
Payer: COMMERCIAL

## 2024-07-22 DIAGNOSIS — D69.6 PLATELETS DECREASED (CMS-HCC): ICD-10-CM

## 2024-07-22 PROCEDURE — 76700 US EXAM ABDOM COMPLETE: CPT | Performed by: STUDENT IN AN ORGANIZED HEALTH CARE EDUCATION/TRAINING PROGRAM

## 2024-07-22 PROCEDURE — 76700 US EXAM ABDOM COMPLETE: CPT

## 2024-07-24 ENCOUNTER — HOSPITAL ENCOUNTER (OUTPATIENT)
Dept: GASTROENTEROLOGY | Facility: HOSPITAL | Age: 49
Discharge: HOME | End: 2024-07-24
Payer: COMMERCIAL

## 2024-07-24 VITALS
RESPIRATION RATE: 17 BRPM | HEIGHT: 68 IN | DIASTOLIC BLOOD PRESSURE: 70 MMHG | SYSTOLIC BLOOD PRESSURE: 118 MMHG | OXYGEN SATURATION: 99 % | BODY MASS INDEX: 32.58 KG/M2 | TEMPERATURE: 97.2 F | HEART RATE: 79 BPM | WEIGHT: 215 LBS

## 2024-07-24 DIAGNOSIS — Z12.11 COLON CANCER SCREENING: Primary | ICD-10-CM

## 2024-07-24 PROCEDURE — 3700000012 HC SEDATION LEVEL 5+ TIME - INITIAL 15 MINUTES 5/> YEARS

## 2024-07-24 PROCEDURE — 99153 MOD SED SAME PHYS/QHP EA: CPT

## 2024-07-24 PROCEDURE — 3700000013 HC SEDATION LEVEL 5+ TIME - EACH ADDITIONAL 15 MINUTES

## 2024-07-24 PROCEDURE — 7100000009 HC PHASE TWO TIME - INITIAL BASE CHARGE

## 2024-07-24 PROCEDURE — 99152 MOD SED SAME PHYS/QHP 5/>YRS: CPT | Mod: 59

## 2024-07-24 PROCEDURE — 99153 MOD SED SAME PHYS/QHP EA: CPT | Performed by: INTERNAL MEDICINE

## 2024-07-24 PROCEDURE — 7100000010 HC PHASE TWO TIME - EACH INCREMENTAL 1 MINUTE

## 2024-07-24 PROCEDURE — 99152 MOD SED SAME PHYS/QHP 5/>YRS: CPT | Performed by: INTERNAL MEDICINE

## 2024-07-24 PROCEDURE — 2500000004 HC RX 250 GENERAL PHARMACY W/ HCPCS (ALT 636 FOR OP/ED): Performed by: INTERNAL MEDICINE

## 2024-07-24 PROCEDURE — 45378 DIAGNOSTIC COLONOSCOPY: CPT | Performed by: INTERNAL MEDICINE

## 2024-07-24 RX ORDER — MEPERIDINE HYDROCHLORIDE 25 MG/ML
INJECTION INTRAMUSCULAR; INTRAVENOUS; SUBCUTANEOUS AS NEEDED
Status: COMPLETED | OUTPATIENT
Start: 2024-07-24 | End: 2024-07-24

## 2024-07-24 RX ORDER — SODIUM CHLORIDE, SODIUM LACTATE, POTASSIUM CHLORIDE, CALCIUM CHLORIDE 600; 310; 30; 20 MG/100ML; MG/100ML; MG/100ML; MG/100ML
20 INJECTION, SOLUTION INTRAVENOUS CONTINUOUS
Status: DISCONTINUED | OUTPATIENT
Start: 2024-07-24 | End: 2024-07-25 | Stop reason: HOSPADM

## 2024-07-24 RX ORDER — MIDAZOLAM HYDROCHLORIDE 5 MG/ML
INJECTION, SOLUTION INTRAMUSCULAR; INTRAVENOUS AS NEEDED
Status: COMPLETED | OUTPATIENT
Start: 2024-07-24 | End: 2024-07-24

## 2024-07-24 RX ORDER — MIDAZOLAM HYDROCHLORIDE 1 MG/ML
INJECTION, SOLUTION INTRAMUSCULAR; INTRAVENOUS AS NEEDED
Status: COMPLETED | OUTPATIENT
Start: 2024-07-24 | End: 2024-07-24

## 2024-07-24 ASSESSMENT — PAIN SCALES - GENERAL
PAINLEVEL_OUTOF10: 0 - NO PAIN

## 2024-07-24 ASSESSMENT — PAIN - FUNCTIONAL ASSESSMENT
PAIN_FUNCTIONAL_ASSESSMENT: 0-10

## 2024-07-24 ASSESSMENT — COLUMBIA-SUICIDE SEVERITY RATING SCALE - C-SSRS
1. IN THE PAST MONTH, HAVE YOU WISHED YOU WERE DEAD OR WISHED YOU COULD GO TO SLEEP AND NOT WAKE UP?: NO
2. HAVE YOU ACTUALLY HAD ANY THOUGHTS OF KILLING YOURSELF?: NO
6. HAVE YOU EVER DONE ANYTHING, STARTED TO DO ANYTHING, OR PREPARED TO DO ANYTHING TO END YOUR LIFE?: NO

## 2024-07-24 NOTE — DISCHARGE INSTRUCTIONS
Patient Instructions after a Colonoscopy      The anesthetics, sedatives or narcotics which were given to you today will be acting in your body for the next 24 hours, so you might feel a little sleepy or groggy.  This feeling should slowly wear off. Carefully read and follow the instructions.     You received sedation today:  - Do not drive or operate any machinery or power tools of any kind.   - No alcoholic beverages today, not even beer or wine.  - Do not make any important decisions or sign any legal documents.  - No over the counter medications that contain alcohol or that may cause drowsiness.  - Do not make any important decisions or sign any legal documents.  - Make sure you have someone with you for first 24 hours.    While it is common to experience mild to moderate abdominal distention, gas, or belching after your procedure, if any of these symptoms occur following discharge from the GI Lab or within one week of having your procedure, call the [facility] to be advised whether a visit to your nearest Urgent Care or Emergency Department is indicated.  Take this paper with you if you go.     - If you develop an allergic reaction to the medications that were given during your procedure such as difficulty breathing, rash, hives, severe nausea, vomiting or lightheadedness.  - If you experience chest pain, shortness of breath, severe abdominal pain, fevers and chills.  -If you develop signs and symptoms of bleeding such as blood in your spit, if your stools turn black, tarry, or bloody  - If you have not urinated within 8 hours following your procedure.  - If your IV site becomes painful, red, inflamed, or looks infected.    If you received a biopsy/polypectomy/sphincterotomy the following instructions apply below:    __ Do not use Aspirin containing products, non-steroidal medications or anti-coagulants for one week following your procedure. (Examples of these types of medications are: Advil,  Arthrotec, Aleve, Coumadin, Ecotrin, Heparin, Ibuprofen, Indocin, Motrin, Naprosyn, Nuprin, Plavix, Vioxx, and Voltarin, or their generic forms.  This list is not all-inclusive.  Check with your physician or pharmacist before resuming medications.)   __ Eat a soft diet today.  Avoid foods that are poorly digested for the next 24 hours.  These foods would include: nuts, beans, lettuce, red meats, and fried foods. Start with liquids and advance your diet as tolerated, gradually work up to eating solids.   __ Do not have a Barium Study or Enema for one week.    Your physician recommends the additional following instructions:    -You have a contact number available for emergencies. The signs and symptoms of potential delayed complications were discussed with you. You may return to normal activities tomorrow.  -Resume your previous diet.  -Continue your present medications.   -We are waiting for your pathology results.  -Your physician has recommended a repeat colonoscopy (date to be determined after pending pathology results are reviewed) for surveillance based on pathology results.  -The findings and recommendations have been discussed with you.  -The findings and recommendations were discussed with your family.  - Please see Medication Reconciliation Form for new medication/medications prescribed.       If you experience any problems or have any questions following discharge from the GI Lab, please call:    Aleksandr Rodrigez MD  [phone]      Nurse Signature                                                                        Date___________________                                                                            Patient/Responsible Party Signature                                        Date___________________

## 2024-07-24 NOTE — H&P
"History Of Present Illness  Jaime Garcia is a 49 y.o. male presenting with screening.     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 His smoking use included cigarettes. He started smoking about 33 years ago. He has a 24.1 pack-year smoking history. He has never used smokeless tobacco. He reports current alcohol use. He reports current drug use. Drug: Marijuana.    Family History  Family History   Problem Relation Name Age of Onset    Diverticulitis Father      Diabetes Paternal Grandmother          Allergies  No Known Allergies  Review of Systems  Pre-sedation Evaluation:  ASA Classification - ASA 2 - Patient with mild systemic disease with no functional limitations  Mallampati Score - II (hard and soft palate, upper portion of tonsils and uvula visible)    Physical Exam  Vitals reviewed.   Constitutional:       Appearance: Normal appearance.   Cardiovascular:      Rate and Rhythm: Normal rate and regular rhythm.   Pulmonary:      Breath sounds: Normal breath sounds.   Neurological:      Mental Status: He is alert.          Last Recorded Vitals  Blood pressure 127/80, pulse 80, temperature 36.2 °C (97.2 °F), resp. rate 19, height 1.727 m (5' 8\"), weight 97.5 kg (215 lb), SpO2 97%.     Assessment/Plan   R/O neoplasm for colonoscopy     PTA/Current Medications:  (Not in a hospital admission)    Current Outpatient Medications   Medication Sig Dispense Refill    esomeprazole (NexIUM) 40 mg packet Take 20 mg by mouth once daily in the morning. Take before meals.      polyethylene glycol-electrolytes (Golytely) 420 gram solution Begin drinking first half of prep 6pm the night before procedure. Start second half 5 hours before procedure time. 4000 mL 0    sulfamethoxazole-trimethoprim (Bactrim DS) " 800-160 mg tablet Take 1 tablet by mouth 2 times a day for 10 days. 20 tablet 0     No current facility-administered medications for this encounter.     Aleksandr Rodrigez MD

## 2024-07-25 ASSESSMENT — PAIN SCALES - GENERAL: PAINLEVEL_OUTOF10: 0 - NO PAIN

## 2024-07-25 NOTE — ADDENDUM NOTE
Encounter addended by: Lisette Nava RN on: 7/25/2024 8:38 AM   Actions taken: Contacts section saved, Flowsheet macro applied, Flowsheet accepted

## 2024-07-27 PROBLEM — L03.011 PARONYCHIA OF RIGHT LITTLE FINGER: Status: ACTIVE | Noted: 2024-07-27

## 2024-07-31 ENCOUNTER — OFFICE VISIT (OUTPATIENT)
Dept: PRIMARY CARE | Facility: CLINIC | Age: 49
End: 2024-07-31
Payer: COMMERCIAL

## 2024-07-31 VITALS
SYSTOLIC BLOOD PRESSURE: 114 MMHG | BODY MASS INDEX: 33.15 KG/M2 | DIASTOLIC BLOOD PRESSURE: 74 MMHG | WEIGHT: 218 LBS | OXYGEN SATURATION: 96 % | HEART RATE: 77 BPM | TEMPERATURE: 98 F

## 2024-07-31 DIAGNOSIS — R06.81 WITNESSED APNEIC SPELLS: Primary | ICD-10-CM

## 2024-07-31 DIAGNOSIS — R06.83 SNORING: ICD-10-CM

## 2024-07-31 PROBLEM — D69.6 THROMBOCYTOPENIA (CMS-HCC): Status: ACTIVE | Noted: 2024-07-31

## 2024-07-31 PROCEDURE — 1036F TOBACCO NON-USER: CPT | Performed by: FAMILY MEDICINE

## 2024-07-31 PROCEDURE — 99213 OFFICE O/P EST LOW 20 MIN: CPT | Performed by: FAMILY MEDICINE

## 2024-07-31 ASSESSMENT — PAIN SCALES - GENERAL: PAINLEVEL: 0-NO PAIN

## 2024-07-31 NOTE — PROGRESS NOTES
Subjective   Patient ID: Jaime Garcia is a 49 y.o. male who presents for Sleep Apnea.    HPI   Worried about possible sleep apnea.  +Snoring, +Witnessed apneic episodes.  No daytime sleepiness.    Review of Systems  No other complaints.     Objective   /74 (BP Location: Left arm, Patient Position: Sitting, BP Cuff Size: Adult)   Pulse 77   Temp 36.7 °C (98 °F) (Temporal)   Wt 98.9 kg (218 lb)   SpO2 96%   BMI 33.15 kg/m²     Physical Exam  Constitutional:       General: He is not in acute distress.     Appearance: He is obese.   Neurological:      Mental Status: He is oriented to person, place, and time.   Psychiatric:         Mood and Affect: Mood normal.         Behavior: Behavior normal.     Assessment/Plan   Diagnoses and all orders for this visit:  Witnessed apneic spells  -     Home sleep apnea test (HSAT); Future  Snoring  -     Home sleep apnea test (HSAT); Future    Sleep study ordered.    F/U 10 months as previously advised: Annual wellness visit.

## 2024-08-05 ENCOUNTER — TELEMEDICINE (OUTPATIENT)
Dept: HEMATOLOGY/ONCOLOGY | Facility: CLINIC | Age: 49
End: 2024-08-05
Payer: COMMERCIAL

## 2024-08-05 DIAGNOSIS — D69.6 PLATELETS DECREASED (CMS-HCC): ICD-10-CM

## 2024-08-05 PROCEDURE — 99214 OFFICE O/P EST MOD 30 MIN: CPT | Performed by: INTERNAL MEDICINE

## 2024-08-05 ASSESSMENT — COLUMBIA-SUICIDE SEVERITY RATING SCALE - C-SSRS
6. HAVE YOU EVER DONE ANYTHING, STARTED TO DO ANYTHING, OR PREPARED TO DO ANYTHING TO END YOUR LIFE?: NO
2. HAVE YOU ACTUALLY HAD ANY THOUGHTS OF KILLING YOURSELF?: NO
1. IN THE PAST MONTH, HAVE YOU WISHED YOU WERE DEAD OR WISHED YOU COULD GO TO SLEEP AND NOT WAKE UP?: NO

## 2024-08-05 ASSESSMENT — PAIN SCALES - GENERAL: PAINLEVEL: 0-NO PAIN

## 2024-08-05 NOTE — PROGRESS NOTES
Patient ID: Jaime Garcia is a 49 y.o. male.  Referring Physician: David Guevara MD  [address]  [address] [place] [address]  Primary Care Provider: Michele Sahu MD  Referral Reason:     Subjective: thrombocytopenia      Heme/Onc History:  Has not seen MD for 16 years. Has 30 PY smoking history.    Had CT lung ca screening in 05/24: sub cnm nodule    Denies any bruising, bleeding. He drinks 12 or more beers 2-3 times weekly.        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     Social History:   Social History     Socioeconomic History    Marital status:      Spouse name: Not on file    Number of children: Not on file    Years of education: Not on file    Highest education level: Not on file   Occupational History    Not on file   Tobacco Use    Smoking status: Former     Average packs/day: 0.8 packs/day for 32.2 years (24.1 ttl pk-yrs)     Types: Cigarettes     Start date: 1991    Smokeless tobacco: Never   Vaping Use    Vaping status: Never Used   Substance and Sexual Activity    Alcohol use: Not Currently    Drug use: Yes     Types: Marijuana     Comment: TWICE/MONTH    Sexual activity: Not on file   Other Topics Concern    Not on file   Social History Narrative    Not on file     Social Determinants of Health     Financial Resource Strain: Not on file   Food Insecurity: Not on file   Transportation Needs: Not on file   Physical Activity: Not on file   Stress: Not on file   Social Connections: Not on file   Intimate Partner Violence: Not on file   Housing Stability: Not on file     Surgical History:   Past Surgical History:   Procedure Laterality Date    APPENDECTOMY       Family History:   Family History   Problem Relation Name Age of Onset    Diverticulitis Father      Diabetes Paternal Grandmother        reports that he has quit smoking. His smoking use included cigarettes. He started smoking about 33 years ago. He has a  24.1 pack-year smoking history. He has never used smokeless tobacco.  Oncology Family history: Cancer-related family history is not on file.    Review Of Systems:  As stated per in HPI; otherwise all other 12 point ROS are negative    Physical Exam:  There were no vitals taken for this visit.  BSA: There is no height or weight on file to calculate BSA.  General: awake/alert/oriented x3, no distress, alert and cooperative  Head: Short hair fully covering scalp. Symmetric facial expressions  Eyes: PERRL, EOMI, clear sclera, eyebrows present.  Ears/Nose/Mouth/Throat:  Oral mucous membranes moist. No oral ulcers. No palpable pre/post-auricular lymph nodes  Neck: No palpable cervical chain lymph nodes  Respiratory: unlabored breathing on room air, good chest expansion, thorax symmetric  Cardio: Regular rate and rhythm, normal S1 and S2, radial pulses symmetric  GI: Nondistended, soft, non-tender abdomen  Musculoskeletal: Normal muscle bulk and tone, ROM intact, no joint swelling.  Rises from chair and walks unassisted.  Extremities: No ankle swelling, no arm or leg wounds  Neuro: Alert, cognition intact, speech normal. Facial expressions symmetric.  No motor deficits noted. Sensation intact to touch and hot/cold.   Able to stand from seated position unassisted and walks around the room unassisted.  Psychological: Appropriate mood and behavior.  Skin: Warm and dry, no lesions, no rashes    Results:  Diagnostic Results   Lab Results   Component Value Date    WBC 8.0 07/09/2024    HGB 14.9 07/09/2024    HCT 41.8 07/09/2024    MCV 85 07/09/2024     (L) 07/09/2024     Lab Results   Component Value Date    CALCIUM 9.4 07/09/2024     07/09/2024    K 3.9 07/09/2024    CO2 24 07/09/2024     07/09/2024    BUN 14 07/09/2024    CREATININE 0.99 07/09/2024    ALT 45 07/09/2024    AST 22 07/09/2024       Current Outpatient Medications:     esomeprazole (NexIUM) 40 mg packet, Take 20 mg by mouth once daily in the  morning. Take before meals., Disp: , Rfl:     polyethylene glycol-electrolytes (Golytely) 420 gram solution, Begin drinking first half of prep 6pm the night before procedure. Start second half 5 hours before procedure time. (Patient not taking: Reported on 7/31/2024), Disp: 4000 mL, Rfl: 0     Radiology:    Pathology:    Assessment/Plan:  ? Thrombocytopenia:    He drinks 60-80 cans of beer weekly. Abd US shows diffuse fatty infiltration of the liver and splenomegaly with 14.4 cm spleen.    No other cytopenias. Likely benign related to fatty liver and splenomegaly.  Advised to start Paleo diet as he had lost weight with this diet before and has cut back on drinking to 7 beers per week.    2- Lung cancer screening: sub cnm nodule detected in 05/24. His first CT chest. Quit smoking a year ago.    Check labs in 6 months    Diagnoses and all orders for this visit:  Platelets decreased (CMS-HCC)  -     Clinic Appointment Request Virtual Est       Performance Status: Asymptomatic    I spent more than 30 minutes for the patient today, including face-to-face conversation, pre-visit preparation, post-visit orders, and others.   David Guevara MD

## 2024-08-17 ENCOUNTER — PROCEDURE VISIT (OUTPATIENT)
Dept: SLEEP MEDICINE | Facility: HOSPITAL | Age: 49
End: 2024-08-17
Payer: COMMERCIAL

## 2024-08-17 DIAGNOSIS — R06.81 WITNESSED APNEIC SPELLS: ICD-10-CM

## 2024-08-17 DIAGNOSIS — R06.83 SNORING: ICD-10-CM

## 2024-08-17 PROCEDURE — 95806 SLEEP STUDY UNATT&RESP EFFT: CPT | Performed by: PSYCHIATRY & NEUROLOGY

## 2024-08-26 ENCOUNTER — APPOINTMENT (OUTPATIENT)
Dept: HEMATOLOGY/ONCOLOGY | Facility: CLINIC | Age: 49
End: 2024-08-26
Payer: COMMERCIAL

## 2024-09-07 DIAGNOSIS — G47.33 OSA (OBSTRUCTIVE SLEEP APNEA): Primary | ICD-10-CM

## 2024-11-27 ENCOUNTER — HOSPITAL ENCOUNTER (OUTPATIENT)
Dept: RADIOLOGY | Facility: CLINIC | Age: 49
End: 2024-11-27
Payer: COMMERCIAL

## 2024-12-03 ENCOUNTER — OFFICE VISIT (OUTPATIENT)
Dept: URGENT CARE | Age: 49
End: 2024-12-03
Payer: COMMERCIAL

## 2024-12-03 VITALS
DIASTOLIC BLOOD PRESSURE: 81 MMHG | BODY MASS INDEX: 34.67 KG/M2 | OXYGEN SATURATION: 97 % | TEMPERATURE: 98.6 F | RESPIRATION RATE: 17 BRPM | WEIGHT: 228 LBS | HEART RATE: 101 BPM | SYSTOLIC BLOOD PRESSURE: 116 MMHG

## 2024-12-03 DIAGNOSIS — J18.9 PNEUMONIA OF LEFT LOWER LOBE DUE TO INFECTIOUS ORGANISM: Primary | ICD-10-CM

## 2024-12-03 DIAGNOSIS — S09.21XA: ICD-10-CM

## 2024-12-03 RX ORDER — BENZONATATE 200 MG/1
200 CAPSULE ORAL 3 TIMES DAILY PRN
Qty: 42 CAPSULE | Refills: 0 | Status: SHIPPED | OUTPATIENT
Start: 2024-12-03 | End: 2025-01-02

## 2024-12-03 RX ORDER — OFLOXACIN 3 MG/ML
5 SOLUTION AURICULAR (OTIC) 2 TIMES DAILY
Qty: 5 ML | Refills: 0 | Status: SHIPPED | OUTPATIENT
Start: 2024-12-03 | End: 2024-12-10

## 2024-12-03 RX ORDER — AZITHROMYCIN 250 MG/1
TABLET, FILM COATED ORAL
Qty: 6 TABLET | Refills: 0 | Status: SHIPPED | OUTPATIENT
Start: 2024-12-03 | End: 2024-12-08

## 2024-12-03 NOTE — PROGRESS NOTES
Subjective   Patient ID: Jaime Garcia is a 49 y.o. male. They present today with a chief complaint of Cough and Earache (Pt c/o persistent cough starting Saturday. States that he coughed so hard last nite felt something pop in his right ear. Has been taking Mucinex, Tesslon, cough syrup and tylenol. Son sick 1 week prior. TWAN, RN).    History of Present Illness  1-year-old male coming in with complaints of cough and right ear pain.  He states he has been coughing for the last 4 or 5 days.  He states he has been taking Mucinex, cough syrup, and Tessalon for the cough.  He states that he has not had any fevers or chills.  He states yesterday he was coughing so hard that he felt something pop in his right ear and now has been having off-and-on pain.  He denies any other complaints today.    Past Medical History  Allergies as of 12/03/2024    (No Known Allergies)       (Not in a hospital admission)       Past Medical History:   Diagnosis Date    Accessory ureter     states 2 ureters on right side       Past Surgical History:   Procedure Laterality Date    APPENDECTOMY          reports that he has quit smoking. His smoking use included cigarettes. He started smoking about 33 years ago. He has a 24.1 pack-year smoking history. He has never used smokeless tobacco. He reports that he does not currently use alcohol. He reports current drug use. Drug: Marijuana.    Review of Systems  Review of Systems:  General: No weight loss, fatigue, anorexia, insomnia, fever, chills.  ENT: No pharyngitis, dry mouth, positive nasal congestion, positive right ear pain  Cardiac: No chest pain, palpitations, syncope, near syncope.  Pulmonary:  No shortness of breath, positive cough, no hemoptysis  Heme/lymph: No swollen glands, fever, bleeding  Musculoskeletal: No limb pain, joint pain, joint swelling.  Skin: No rashes  Neuro: No numbness, tingling, headaches                                 Objective    Vitals:    12/03/24 0909   BP: 116/81    BP Location: Left arm   Patient Position: Sitting   BP Cuff Size: Adult   Pulse: 101   Resp: 17   Temp: 37 °C (98.6 °F)   TempSrc: Oral   SpO2: 97%   Weight: 103 kg (228 lb)     No LMP for male patient.    Physical Exam  Physical Exam:  General: Vital noted, no distress. Afebrile  EENT: Eyes unremarkable, Pupils PERRLA, EOMs intact.  Left TM unremarkable, Right TM with perforation noted with dried blood in the ear cannal. Posterior oropharynx unremarkable. Uvula in the midline and non-edematous. No PTA. No retropharyngeal mass. No Mark's angina.  Cardiac: Regular rate and rhythm, no murmur  Pulmonary: Lungs with rhonchi in the left lower lobe, remainder of the lung fields are clear bilaterally with good aeration. No adventitious breath sounds.  Skin: No rashes  Neuro: No focal neurologic deficits    Procedures    Point of Care Test & Imaging Results from this visit  No results found for this visit on 12/03/24.   No results found.    Diagnostic study results (if any) were reviewed by KRISTOFER Rod.    Assessment/Plan   Allergies, medications, history, and pertinent labs/EKGs/Imaging reviewed by KRISTOFER Rod.     Medical Decision Making  Treatment: Zithromax, tessalon, and ofloxacin prescribed  Differential: 1) pneumonia , 2) bronchitis , 3) spontaneous rupture of ear  Plan: Patient will follow up with the PCP in the next 2-3 days. Return for any worsening symptoms or go to the ER for further evaluation. Patient understands return precautions and discharge insturctions.  Impression:   1) pneumonia  2) Spontaneous rupture of right TM      Orders and Diagnoses  Diagnoses and all orders for this visit:  Pneumonia of left lower lobe due to infectious organism  -     azithromycin (Zithromax) 250 mg tablet; Take 2 tabs (500 mg) by mouth today, than 1 daily for 4 days.  -     benzonatate (Tessalon) 200 mg capsule; Take 1 capsule (200 mg) by mouth 3 times a day as needed for cough. Do not crush or  chew.  Traumatic rupture of tympanic membrane, right, initial encounter  -     ofloxacin (Floxin) 0.3 % otic solution; Administer 5 drops into the right ear 2 times a day for 7 days.      Medical Admin Record      Patient disposition: Home    Electronically signed by RAISA Rod-JENNY  9:26 AM

## 2024-12-09 ENCOUNTER — CLINICAL SUPPORT (OUTPATIENT)
Dept: AUDIOLOGY | Facility: CLINIC | Age: 49
End: 2024-12-09
Payer: COMMERCIAL

## 2024-12-09 DIAGNOSIS — H90.6 MIXED CONDUCTIVE AND SENSORINEURAL HEARING LOSS OF BOTH EARS: Primary | ICD-10-CM

## 2024-12-09 PROCEDURE — 92557 COMPREHENSIVE HEARING TEST: CPT

## 2024-12-09 PROCEDURE — 92567 TYMPANOMETRY: CPT

## 2024-12-09 ASSESSMENT — PAIN - FUNCTIONAL ASSESSMENT: PAIN_FUNCTIONAL_ASSESSMENT: 0-10

## 2024-12-09 ASSESSMENT — PAIN SCALES - GENERAL: PAINLEVEL_OUTOF10: 0 - NO PAIN

## 2024-12-09 NOTE — PROGRESS NOTES
AUDIOLOGIC EVALUATION      Name:  Jaime Garcia  :  1975  Age:  49 y.o.  Date of Evaluation:  2024    Time: 2668-2065    HISTORY:  Jaime Garcia, 49 y.o., was seen for an audiologic assessment prior to ENT evaluation. He has a known bilateral mixed hearing loss without hearing aid use. He noted that he recently felt his right ear pop. At that time he noted right otorrhea, aural fullness, and otalgia. Urgent Care diagnosed him with a right tympanic membrane perforation and was treated with otic drops. Hs otorrhea has resolved but he still has right otalgia and aural fullness. He noted putting an ear plug in his right ear helps with the pressure. He has long-standing bilateral tinnitus and left pulsatile tinnitus. He has a history of excessive occupational and recreational noise exposure. He denied dizziness, history of otologic surgeries, and recent falls.       RESULTS:  Otoscopic Evaluation:  Right Ear: Redness/Vascularization on TM, possible hemotympanum   Left Ear: Unremarkable    Immittance Measures:  Right Ear: Type B, normal volume -- indicating fluid in the middle ear  Left Ear: Type A -- indicating normal volume, pressure, and static compliance    Acoustic Reflexes:  Right Ear: Could not test due to type B immittance result.  Left Ear: (Ipsilateral) Responses present at expected levels for, absent at 500-4000 Hz.    Pure Tone Audiometry:    Right Ear: Essentially moderately-severe mixed hearing loss rising to moderate mixed hearing loss    Left Ear: Moderately-severe mixed hearing loss rising to mild mixed hearing loss    Asymmetry: Yes, right ear poorer 9667-6654 Hz    In comparison to previous audio completed on 24, his hearing has declined in the right ear and remained stable in the left ear. Note, mixed hearing loss present bilaterally, prior to type B tympanogram.      Reliability:   Good    Speech Audiometry:   Right: Speech Reception Threshold (SRT) was obtained at 60 dBHL.   SRT/PTA  in Good agreement with pure tone average.    Left: Speech Reception Threshold (SRT) was obtained at 50 dBHL.   SRT/PTA in Good agreement with pure tone average.    Word Recognition Scores (WRS):  Right Ear: excellent (100%) in quiet when words were presented at 90 dBHL w/ masking.   Left Ear: excellent (100%) in quiet when words were presented at 85 dBHL w/ masking.     Asymmetry: No      IMPRESSIONS:  Today's testing revealed normal ear canal volume, with no measurable middle ear pressure or tympanic membrane compliance consistent with middle ear effusion in the right ear. In the left ear he has normal ear canal volume, middle ear pressure, and tympanic membrane compliance. He has essentially moderately-severe mixed hearing loss rising to moderate mixed hearing loss in the right ear. In the left ear he has moderately-severe mixed hearing loss rising to mild mixed hearing loss. He has a noted asymmetry, right ear poorer 6976-1736 Hz. In comparison to previous audio completed on 7/1/24, his hearing has declined in the right ear likely secondary to his middle ear effusion and remained stable in the left ear. Note, mixed hearing loss present bilaterally, prior to type B tympanogram. The results were discussed with the patient.     He should follow-up with ENT as scheduled for further management of his middle ear pathology. He should consider further evaluation of his bilateral mixed hearing loss and possible medical management.       RECOMMENDATIONS:  1.) Continue medical follow up with Ears Nose and Throat (ENT) provider as recommended.  2.) Return for audiologic evaluation in conjunction with medical management or annually (whichever is sooner) to monitor hearing sensitivity and assess middle ear status or sooner should concerns arise. The audiology department can be reached at [phone] to schedule an appointment.       César Pickett, CCC-A  Clinical Audiologist        KEY  SNHL Sensorineural Hearing  Loss   CHL Conductive Hearing Loss   MHL Mixed Hearing Loss   SSNHL Sudden Sensorineural Hearing Loss   WNL Within Normal Limits   PTA Pure Tone Average   TM Tympanic Membrane   ECV Ear Canal Volume   SRT Speech Reception Threshold   WRS Word Recognition Score

## 2024-12-11 ENCOUNTER — OFFICE VISIT (OUTPATIENT)
Dept: OTOLARYNGOLOGY | Facility: CLINIC | Age: 49
End: 2024-12-11
Payer: COMMERCIAL

## 2024-12-11 VITALS
TEMPERATURE: 98.3 F | HEART RATE: 103 BPM | SYSTOLIC BLOOD PRESSURE: 118 MMHG | HEIGHT: 68 IN | DIASTOLIC BLOOD PRESSURE: 76 MMHG | WEIGHT: 230 LBS | BODY MASS INDEX: 34.86 KG/M2

## 2024-12-11 DIAGNOSIS — H93.A2 PULSATILE TINNITUS OF LEFT EAR: Primary | ICD-10-CM

## 2024-12-11 DIAGNOSIS — H90.6 MIXED HEARING LOSS, BILATERAL: ICD-10-CM

## 2024-12-11 DIAGNOSIS — H74.8X1 HEMATOTYMPANUM OF RIGHT EAR: ICD-10-CM

## 2024-12-11 DIAGNOSIS — R94.128 ABNORMAL TYMPANOGRAM: ICD-10-CM

## 2024-12-11 DIAGNOSIS — H93.8X1 SENSATION OF FULLNESS IN RIGHT EAR: ICD-10-CM

## 2024-12-11 PROCEDURE — 3008F BODY MASS INDEX DOCD: CPT | Performed by: NURSE PRACTITIONER

## 2024-12-11 PROCEDURE — 99204 OFFICE O/P NEW MOD 45 MIN: CPT | Performed by: NURSE PRACTITIONER

## 2024-12-11 PROCEDURE — 99214 OFFICE O/P EST MOD 30 MIN: CPT | Performed by: NURSE PRACTITIONER

## 2024-12-11 SDOH — ECONOMIC STABILITY: FOOD INSECURITY: WITHIN THE PAST 12 MONTHS, THE FOOD YOU BOUGHT JUST DIDN'T LAST AND YOU DIDN'T HAVE MONEY TO GET MORE.: NEVER TRUE

## 2024-12-11 SDOH — ECONOMIC STABILITY: FOOD INSECURITY: WITHIN THE PAST 12 MONTHS, YOU WORRIED THAT YOUR FOOD WOULD RUN OUT BEFORE YOU GOT MONEY TO BUY MORE.: NEVER TRUE

## 2024-12-11 ASSESSMENT — ENCOUNTER SYMPTOMS
OCCASIONAL FEELINGS OF UNSTEADINESS: 0
DEPRESSION: 0
LOSS OF SENSATION IN FEET: 0

## 2024-12-11 ASSESSMENT — PAIN SCALES - GENERAL: PAINLEVEL_OUTOF10: 0-NO PAIN

## 2024-12-11 ASSESSMENT — LIFESTYLE VARIABLES
AUDIT-C TOTAL SCORE: 6
SKIP TO QUESTIONS 9-10: 0
HOW MANY STANDARD DRINKS CONTAINING ALCOHOL DO YOU HAVE ON A TYPICAL DAY: 5 OR 6
HOW OFTEN DO YOU HAVE SIX OR MORE DRINKS ON ONE OCCASION: NEVER
HOW OFTEN DO YOU HAVE A DRINK CONTAINING ALCOHOL: 4 OR MORE TIMES A WEEK

## 2024-12-11 NOTE — PATIENT INSTRUCTIONS
- Call [phone] to schedule your CTA and CT IAC. I will follow up with results.  - I recommended Flonase 2 sprays each nostril once a day. Patient was given instruction on proper application of the medication by spraying intranasally and aiming towards the outer corners of the eyes. Patient was instructed to avoid the septum due to the risk of nasal bleeding.  - Begin use of Afrin.  Administer two sprays in each nostril twice per day.  Do this for 3 days in a row.  Take one day off.  Do for another 3 days in a row.  Take one day off.  Do for another 3 days in a row, and then stop the medication cycle.  If you do not take a break using Afrin after 3 days of use, rebound nasal congestion may occur.    Welcome to Rosana Handy's clinic. We are here to assist you through your ENT care at [facility].  Rosana is a Nurse Practitioner who specializes in General ENT. This means that she specializes in taking care of patients with usual ENT issues such as nasal congestion, allergy symptoms, sinusitis, hearing loss, ear infections, ear wax removal, hoarseness, sore throat, throat infections, reflux and some swallowing issues. She also sees patients regarding dizziness and vertigo.   Pham is Rosana's  and she answers the office phone from 7:30am-4pm Mon-Fri. Call [phone]. She can help you with scheduling of appointments, and general questions and information. You may need to leave a message if she is helping another patient. In this case, someone from the team will call you back the same day if you leave your message before 3pm, or the next business morning.  Rosana currently sees patients at [facility] on Mondays, Wednesdays and Thursdays.  She works closely with audiologists to solve issues with hearing. She is also in very close contact with her collaborative physicians. Dr. Jones, a surgeon who specializes in general ENT and rhinology. She also works closely  with otologist (ear surgeon) Dr. Guzman and head and neck surgery Dr. Weeks.   Others who may be included in your care are dieticians, social workers, allergists, gastroenterologists, neurologists, and physical therapists. Rosana will provide these referrals as needed. Please let her know if you would like to request a specific referral.  Rosana makes every effort to run on time for your appointments. Therefore, if you are more than 15 minutes late unrelated to a scan or another appointment such as therapy or audiology, your appointment will need to be rescheduled for another day. We appreciate your understanding.   We look forward to working with you to meet your healthcare goals.

## 2024-12-11 NOTE — PROGRESS NOTES
Subjective   Patient ID: Jaime Garcia is a 49 y.o. male who presents for Hearing Loss.    HPI  Patient here for hearing loss.   He states that his hearing has always been bad, the left side worse. He has had left pulsatile tinnitus constant since 2017.  He got sick the weekend after Thanksgiving. He coughed so hard he perforated the right ear drum. He feels he is in a tunnel now on that side. No real ear pain or drainage. Denies vertigo, did have some in the past.  Denies previous ear surgery. Admits to loud noise exposure. No known family history of hearing loss.     Patient Active Problem List   Diagnosis    Decreased hearing    Class 1 obesity due to excess calories without serious comorbidity with body mass index (BMI) of 34.0 to 34.9 in adult    Elevated glucose    Paronychia of right little finger    Thrombocytopenia (CMS-HCC)    BEATRICE (obstructive sleep apnea)     Past Surgical History:   Procedure Laterality Date    APPENDECTOMY       Review of Systems    All other systems have been reviewed and are negative for complaints except for those mentioned in history of present illness, past medical history and problem list.    Objective   Physical Exam    Constitutional: No fever, chills, weight loss or weight gain  General appearance: Appears well, well-nourished, well groomed. No acute distress.    Communication: Normal communication    Psychiatric: Oriented to person, place and time. Normal mood and affect.    Neurologic: Cranial nerves II-XII grossly intact and symmetric bilaterally.    Head and Face:  Head: Atraumatic with no masses, lesions or scarring.  Face: Normal symmetry. No scars or deformities.  TMJ: Normal, no trismus.    Eyes: Conjunctiva not edematous or erythematous.     Right Ear: External inspection of ear with no deformity, scars, or masses. EAC is clear.  TM is intact with no sign of infection, effusion, or retraction.  Hemotympanum/ecchymosis noted.No perforation seen.    Left Ear: External  inspection of ear with no deformity, scars, or masses. EAC is clear.  TM is intact with no sign of infection, effusion, or retraction.  No perforation seen.  No bruits auscultated preauricular/postauricular. No evidence of glomus tumor.     Traore lateralizes to the right.  AC > BC bilaterally.    Nose: External inspection of nose: No nasal lesions, lacerations or scars. Anterior rhinoscopy with limited visualization past the inferior turbinates. No tenderness on frontal or maxillary sinus palpation.    Oral Cavity/Mouth: Oral cavity and oropharynx mucosa moist and pink. No lesions or masses. Tonsils appear normal. Uvula is midline. Tongue with no masses or lesions. Tongue with good mobility. The oropharynx is clear.    Neck: Normal appearing, symmetric, trachea midline.     Cardiovascular: Examination of peripheral vascular system shows no clubbing or cyanosis. No carotid bruits ausculted. Patient states pulsatile tinnitus decreases with compression of the internal jugular.     Respiratory: No respiratory distress increased work of breathing. Inspection of the chest with symmetric chest expansion and normal respiratory effort.    Skin: No head and neck rashes.    Lymph nodes: No adenopathy.    Diagnostic Results       Assessment/Plan   Diagnoses and all orders for this visit:  Pulsatile tinnitus of left ear  -     CT angio head and neck w and wo IV contrast; Future  -     CT internal auditory canals posterior fossa wo IV contrast; Future  Mixed hearing loss, bilateral  -     CT internal auditory canals posterior fossa wo IV contrast; Future  Hematotympanum of right ear  Sensation of fullness in right ear  Abnormal tympanogram    Reassurance given. I do recommend starting Flonase for the next 6 weeks. Also cycled Afrin. Patient likely has some fluid behind the right tympanic membrane from his previous URI.     I recommend CT IAC due to patient mixed hearing loss and left pulsatile tinnitus to identify/rule out  superior semicircular canal dehiscence, sigmoid sinus dehiscence/diverticulum. CTA to identify/rule out aneurysm, AVM, glomus tumor, carotid artery stenosis, etc.  I will follow up with these results.    All questions answered to patient satisfaction.        Rosana Handy, RAISA-CNP 12/11/24 3:23 PM

## 2024-12-13 ENCOUNTER — TELEPHONE (OUTPATIENT)
Dept: RADIOLOGY | Facility: HOSPITAL | Age: 49
End: 2024-12-13
Payer: COMMERCIAL

## 2024-12-13 NOTE — TELEPHONE ENCOUNTER
Call placed to the patient in an effort to schedule him. The patient does not have voicemail available.

## 2024-12-23 ENCOUNTER — TELEPHONE (OUTPATIENT)
Dept: RADIOLOGY | Facility: HOSPITAL | Age: 49
End: 2024-12-23
Payer: COMMERCIAL

## 2024-12-27 ENCOUNTER — HOSPITAL ENCOUNTER (OUTPATIENT)
Dept: RADIOLOGY | Facility: CLINIC | Age: 49
Discharge: HOME | End: 2024-12-27
Payer: COMMERCIAL

## 2024-12-27 DIAGNOSIS — H93.A2 PULSATILE TINNITUS OF LEFT EAR: ICD-10-CM

## 2024-12-27 DIAGNOSIS — H90.6 MIXED HEARING LOSS, BILATERAL: ICD-10-CM

## 2024-12-27 PROCEDURE — 70480 CT ORBIT/EAR/FOSSA W/O DYE: CPT | Performed by: RADIOLOGY

## 2024-12-27 PROCEDURE — 70480 CT ORBIT/EAR/FOSSA W/O DYE: CPT

## 2025-01-02 ENCOUNTER — HOSPITAL ENCOUNTER (OUTPATIENT)
Dept: RADIOLOGY | Facility: HOSPITAL | Age: 50
Discharge: HOME | End: 2025-01-02
Payer: COMMERCIAL

## 2025-01-02 DIAGNOSIS — H93.A2 PULSATILE TINNITUS OF LEFT EAR: ICD-10-CM

## 2025-01-02 LAB
CREAT SERPL-MCNC: 0.64 MG/DL (ref 0.6–1.3)
GFR SERPL CREATININE-BSD FRML MDRD: >90 ML/MIN/1.73M*2

## 2025-01-02 PROCEDURE — 82565 ASSAY OF CREATININE: CPT

## 2025-01-02 PROCEDURE — 70498 CT ANGIOGRAPHY NECK: CPT | Performed by: RADIOLOGY

## 2025-01-02 PROCEDURE — 70496 CT ANGIOGRAPHY HEAD: CPT

## 2025-01-02 PROCEDURE — 70496 CT ANGIOGRAPHY HEAD: CPT | Performed by: RADIOLOGY

## 2025-01-02 PROCEDURE — 2550000001 HC RX 255 CONTRASTS: Performed by: NURSE PRACTITIONER

## 2025-01-02 RX ADMIN — IOHEXOL 75 ML: 350 INJECTION, SOLUTION INTRAVENOUS at 09:13

## 2025-01-03 ENCOUNTER — TELEPHONE (OUTPATIENT)
Dept: OTOLARYNGOLOGY | Facility: CLINIC | Age: 50
End: 2025-01-03
Payer: COMMERCIAL

## 2025-01-03 DIAGNOSIS — R93.89 ABNORMAL COMPUTED TOMOGRAPHY ANGIOGRAPHY (CTA): Primary | ICD-10-CM

## 2025-01-03 NOTE — TELEPHONE ENCOUNTER
Called patient to review CT IAC and CTA results.    1) On CTA, possible aneurysm versus ophthalmic artery infundibulum found at the left C6 (ophthalmic) segment of the internal carotid artery. Likely not what is causing the pulsatile tinnitus.     Schedule with neurosurgery Dr. Maxwell Garrido by calling  [phone]. Order was placed.    2) CT IAC shows soft tissue within the right tympanic cavity. I reviewed this with one of my ear surgeons and would like to set you up to see Dr. Pa. The office will call to schedule this. We may need to order an MRI of your inner ear as well.     3) CT IAC also shows an unerupted right maxillary molar  into the right maxillary sinus. I am waiting to review this with one of my sinus surgeons and will get back to you when I hear from him. We can also address your nosebleeds once I speak with him.     He also notes that he has been having left-sided nosebleeds about 1-2 times a week during the middle of the night.  He said this started once he stopped smoking.  Other than the nosebleeds, he really does not note any issues with his sinuses.  He states he is very stressed right now with work.  States he has a family history of aneurysms.    I will be reaching out to him in the very near future to discuss the sinus component of his scan and his nosebleeds.  I will have the office call him to schedule with Dr. Pa in the very near future.  I placed an urgent referral for neurosurgery.  Any new symptoms he should proceed to the emergency department or let me know.

## 2025-01-03 NOTE — PROGRESS NOTES
NPV - Referred by Rosana MINER for evaluation of possible aneurysm versus ophthalmic artery infundibulum found at the left C6 (ophthalmic) segment of the internal carotid artery noted on CTA done for work up of pulsatile tinnitus. CTA done 1/2/2025.     Jaime Garcia is a 49 y.o. year old male    HPI  Mr. Garcia is a 50 yo gentleman, who has had left sided pulsatile tinnitus since 2017.  The tinnitus is constant, 24/7, worse with stress.  He had an item dropped on his head around that time at work.  Further workup with CTA showed a possible left paraophthalmic artery aneurysm vs. Infundibulum.  He is referred for further evaluation.     No smoking  FH of cerebral aneurysm, aunt and uncle  No HTN per patient.     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 Take before meals., Disp: , Rfl:     mupirocin (Bactroban) 2 % ointment, Apply a pea sized amount to the pad of the thumb, swipe into the nostril, sniff, then pinch the nose 2-3 times daily. Do NOT use a Q-tip. (Patient not taking: Reported on 1/7/2025), Disp: 30 g, Rfl: 0    polyethylene glycol-electrolytes (Golytely) 420 gram solution, Begin drinking first half of prep 6pm the night before procedure. Start second half 5 hours before procedure time. (Patient not taking: Reported on 1/7/2025), Disp: 4000 mL, Rfl: 0        Objective   Vitals:    01/07/25 0827   BP: 108/74   Pulse: 91   Temp: 36.7 °C (98 °F)       Neurological Exam  AAO x 3  PERRL, EOMI, TS, TML  5/5  Senorsy intact  No drift      [unfilled]  CT angio head and neck w and wo IV contrast  Status: Final result     PACS Images     Show images for CT angio head and neck w and wo IV contrast  Signed  by    Signed Time Phone Pager   Camilo Colon MD 1/02/2025 16:39 [phone]     Exam Information    Status Exam Begun Exam Ended   Final 1/02/2025 09:05 1/02/2025 09:28     Study Result    Narrative & Impression   Interpreted By:  Camilo Colon,  and Irma Kate   STUDY:  CT ANGIO HEAD AND NECK W AND WO IV CONTRAST;  1/2/2025 9:28 am      INDICATION:  Signs/Symptoms:left pulsatile tinnitus.      ,H93.A2 Pulsatile tinnitus, left ear      COMPARISON:  CT of the internal auditory canals obtained December 27, 2024      ACCESSION NUMBER(S):  [id number]      ORDERING CLINICIAN:  YOGESH PHOENIX      TECHNIQUE:  Unenhanced CT images of the head were obtained. Subsequently, 75 ML  of Omnipaque 350 was administered intravenously and axial images of  the head and neck were acquired.  Coronal, sagittal, and 3-D  reconstructions were provided for review.      FINDINGS:  NONCONTRAST HEAD CT:      No intra-axial mass. No mass effect. No evidence of acute  intracranial hemorrhage. No ventriculomegaly. No midline shift. The  basilar cisterns are intact.      No evidence of acute loss of gray-white differentiation.      Minimal focal radiolucency along the floor of the right orbit on  image 52 series 203 could reflect remote orbital floor fracture.  Orbits and globes are otherwise unremarkable in CT appearance.  Unerupted bilateral maxillary molars appear ectopic within the  maxillary sinuses with resulting surrounding soft tissue material,  findings are better assessed on the comparison CT IAC's. Moderate  persistent right mastoid effusion is seen.      CT ANGIOGRAPHY HEAD/NECK:      AORTIC ARCH:      Normal CT appearance of the demonstrated segment of the aortic arch.  Normal variant bovine arch observed.      COMMON CAROTID ARTERIES:      Course and caliber of the common carotid arteries bilaterally is  unremarkable. No evidence of flow-limiting stenosis or significant  atherosclerotic plaque burden.      INTERNAL  CAROTID ARTERIES:      Carotid bulbs are normal. Question possible aneurysmal outpouching  from the ophthalmic segment of the left internal carotid artery at  the level of the superior clinoid process, best appreciated on axial  thin cut imaging (axial series 702, image 301). The extracranial and  intracranial courses of the internal carotid arteries are otherwise  unremarkable bilaterally. No flow-limiting stenosis, evidence of  dissection, or aneurysm.      VERTEBRAL ARTERIES:      Bilateral normal subclavian origins of the vertebral arteries  observed. Dominance of the left vertebral artery is observed,  otherwise courses and calibers of the vertebral arteries  unremarkable. No evidence of crossing vessel of the left  vestibulocochlear nerve complex. No flow-limiting stenosis, evidence  of dissection, or aneurysm. Normal CT assessment of the  vertebrobasilar confluence.      [id number]:      P1 and P2 segments of the posterior cerebral arteries appear normal.  A1 and A2 segments of the anterior cerebral arteries appear normal.      Normal appearances of the M1 and M2 segments of the middle cerebral  arteries.      No evidence of large vessel occlusion. No evidence of aneurysm. No  significant stenoses are identified.      OTHER OSSEOUS/SOFT TISSUE STRUCTURES:      Paravertebral soft tissue structures normal. No pathologically  enlarged or centrally necrotic lymph nodes.      Normal CT assessment of the thyroid. No supraclavicular  lymphadenopathy.      The demonstrated upper lung parenchyma is normal.      No acute or aggressive appearing bony abnormality. No acute traumatic  findings of the cervical spine.      IMPRESSION:  NONCONTRAST HEAD CT:  1. Ectopic maxillary molars within the maxillary sinuses or possibly  dermoid likely with resulting infectious/inflammatory changes that  appear to be largely chronic.  2. Persistent right mastoid and middle ear effusions consistent with  otomastoiditis.      CT  ANGIOGRAPHY HEAD/NECK:  1. No clear vascular malformation in the posterior fossa to explain  the clinical complaint of tinnitus.  2. Subtle 2 mm outpouching observed of the left C6 (ophthalmic)  segment of the internal carotid artery, somewhat limited in  visualization secondary to adjacent osseous structures. Oblique  reformations suggestive of possible aneurysm versus ophthalmic artery  infundibulum. Catheter angiography may be of further used to assess  this lesion. Otherwise, unremarkable angiographic assessment of the  great vessels of the head.          MACRO:  Critical Finding:  See findings. Notification was initiated on  1/2/2025 at 2:32 pm by  Humble Solis.  (**-YCF-**)      Signed by: Camilo Colon 1/2/2025 4:39 PM  Dictation workstation:   [id number]           Assessment/Plan       I had the pleasure of seeing Mr. Garcia and his wife in clinic today.  We had a thorough discussion as well as reviewed his CTA.  He has a small outpouching of the left ICA.  Unclear if this is an aneurysm vs. Infundibulum because the pouch is hidden underneath the left anterior clinoid.  In addition to his constant pulsatile tinnitus, I would recommend a cerebral angiogram for further evaluation.      Risks of cerebral angiography and its related procedures included, but not limited to stroke, hemorrhage, paralysis, coma, death, seizure, infection, vessel injury, hematoma (retroperitoneal or femoral), recurrence of pathology, and/or failure of symptomatic relief.   No guarantees were given.  Patient understands and wishes to proceed.  All questions were answered to their satisfaction.

## 2025-01-06 DIAGNOSIS — R04.0 EPISTAXIS: Primary | ICD-10-CM

## 2025-01-06 RX ORDER — MUPIROCIN 20 MG/G
OINTMENT TOPICAL
Qty: 30 G | Refills: 0 | Status: SHIPPED | OUTPATIENT
Start: 2025-01-06

## 2025-01-07 ENCOUNTER — OFFICE VISIT (OUTPATIENT)
Dept: NEUROSURGERY | Facility: CLINIC | Age: 50
End: 2025-01-07
Payer: COMMERCIAL

## 2025-01-07 VITALS
HEIGHT: 68 IN | TEMPERATURE: 98 F | HEART RATE: 91 BPM | WEIGHT: 230 LBS | DIASTOLIC BLOOD PRESSURE: 74 MMHG | BODY MASS INDEX: 34.86 KG/M2 | SYSTOLIC BLOOD PRESSURE: 108 MMHG

## 2025-01-07 DIAGNOSIS — R93.89 ABNORMAL COMPUTED TOMOGRAPHY ANGIOGRAPHY (CTA): ICD-10-CM

## 2025-01-07 PROCEDURE — 99212 OFFICE O/P EST SF 10 MIN: CPT | Performed by: NEUROLOGICAL SURGERY

## 2025-01-07 PROCEDURE — 99202 OFFICE O/P NEW SF 15 MIN: CPT | Performed by: NEUROLOGICAL SURGERY

## 2025-01-07 PROCEDURE — 3008F BODY MASS INDEX DOCD: CPT | Performed by: NEUROLOGICAL SURGERY

## 2025-01-07 ASSESSMENT — ENCOUNTER SYMPTOMS
OCCASIONAL FEELINGS OF UNSTEADINESS: 0
LOSS OF SENSATION IN FEET: 0

## 2025-01-07 ASSESSMENT — PAIN SCALES - GENERAL: PAINLEVEL_OUTOF10: 0-NO PAIN

## 2025-01-08 DIAGNOSIS — H93.A2 PULSATILE TINNITUS OF LEFT EAR: Primary | ICD-10-CM

## 2025-01-13 ENCOUNTER — APPOINTMENT (OUTPATIENT)
Dept: OTOLARYNGOLOGY | Facility: CLINIC | Age: 50
End: 2025-01-13
Payer: COMMERCIAL

## 2025-01-13 VITALS — HEIGHT: 68 IN | WEIGHT: 234.6 LBS | BODY MASS INDEX: 35.55 KG/M2

## 2025-01-13 DIAGNOSIS — H90.6 MIXED HEARING LOSS, BILATERAL: Primary | ICD-10-CM

## 2025-01-13 DIAGNOSIS — H80.93 OTOSCLEROSIS OF BOTH EARS: ICD-10-CM

## 2025-01-13 DIAGNOSIS — H93.A9 PULSATILE TINNITUS: ICD-10-CM

## 2025-01-13 DIAGNOSIS — H90.6 MIXED HEARING LOSS, BILATERAL: ICD-10-CM

## 2025-01-13 PROCEDURE — 1036F TOBACCO NON-USER: CPT | Performed by: OTOLARYNGOLOGY

## 2025-01-13 PROCEDURE — 99214 OFFICE O/P EST MOD 30 MIN: CPT | Performed by: OTOLARYNGOLOGY

## 2025-01-13 PROCEDURE — 3008F BODY MASS INDEX DOCD: CPT | Performed by: OTOLARYNGOLOGY

## 2025-01-13 RX ORDER — CEFAZOLIN SODIUM 2 G/100ML
2 INJECTION, SOLUTION INTRAVENOUS ONCE
OUTPATIENT
Start: 2025-01-13 | End: 2025-01-13

## 2025-01-13 ASSESSMENT — PATIENT HEALTH QUESTIONNAIRE - PHQ9
SUM OF ALL RESPONSES TO PHQ9 QUESTIONS 1 AND 2: 0
1. LITTLE INTEREST OR PLEASURE IN DOING THINGS: NOT AT ALL
2. FEELING DOWN, DEPRESSED OR HOPELESS: NOT AT ALL

## 2025-01-13 NOTE — PROGRESS NOTES
Chief Complaint: hearing loss and pulsatile tinnitus  Referred by: Rosana Handy, APR*     HISTORY OF PRESENT ILLNESS: This is a 48 yo male who presents today for hearing loss and pulsatile tinnitus. Notes from Rosana Handy reviewed. Imaging reviewed. Family history of aneurysm noted.     Pulsatile tinnitus: Reports constant whooshing noise in the left ear.  Dates back to perhaps 2017, at which time he suffered some head trauma.  No prior workup.  Nothing makes it better.  Patient is very bothered by it.  Recent imaging identified possible ophthalmic aneurysm.  Formal angiogram with neurosurgery is pending.    Hearing loss: Progressive, bilateral.  Right ear is worse.  Hearing loss is causing significant decrease in his quality of life and negatively impacts his work.  He is not particularly interested in hearing aids.  Unclear if there is a family history.  No major history of vertigo or dizziness.  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 He has had left pulsatile tinnitus constant since 2017.  He got sick the weekend after Thanksgiving. He coughed so hard he perforated the right ear drum. He feels he is in a tunnel now on that side. No real ear pain or drainage. Denies vertigo, did have some in the past.  Denies previous ear surgery. Admits to loud noise exposure. No known family history of hearing loss.     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 Take before meals., Disp: , Rfl:     mupirocin (Bactroban) 2 % ointment, Apply a pea sized amount to the pad of the thumb, swipe  "into the nostril, sniff, then pinch the nose 2-3 times daily. Do NOT use a Q-tip., Disp: 30 g, Rfl: 0    polyethylene glycol-electrolytes (Golytely) 420 gram solution, Begin drinking first half of prep 6pm the night before procedure. Start second half 5 hours before procedure time. (Patient not taking: Reported on 12/11/2024), Disp: 4000 mL, Rfl: 0    Review of patient's allergies indicates:  No Known Allergies    Social History and Family History: reviewed in the EMR    New patients fill out a 10-system review of systems survey that gets reviewed at their initial visit. Pertinent positives have been incorporated into the HPI.    PHYSICAL EXAM:   Vitals:    01/13/25 0855   Weight: 106 kg (234 lb 9.6 oz)   Height: 1.727 m (5' 8\")     The patient is well-developed, well-nourished and in no acute distress.    The patient is alert and oriented to time, person, and place with appropriate mood and affect.     EARS: Examination of the external ears was normal using visual inspection. Handheld otoscopy was inadequate to provide fine detail and depth perception necessary for a full diagnostic assessment of the tympanic membrane and middle ear space. The otologic microscope was utilized to improve visualization. Use of the otologic microscope facilitates cleaning of the EAC and three-dimensional, magnified visualization of the tympanic membrane and middle ear structures for diagnosis of observable pathology and anatomical variations. Otoscopic and/or microscopic evaluation reveals:        Right:  External auditory canal: patent   Tympanic Membrane: intact > mobile with autoinsufflation (no residual perforation)  Middle ear: well aerated       Left: External auditory canal: patent   Tympanic Membrane: intact and normal   Middle ear: well aerated     512-Hz Tuning Fork Exam   Traore: right   Rinne: Right - AC < BC; Left - AC < BC    Eyes:  EOMI, vision grossly intact, no nystagmus   Neurologic:  CN 2-12 intact including normal " facial movement and sensation     DATA REVIEWED:   AUDIOGRAMS   Date - 07/2024   - reviewed, also shows bilateral mod-severe mixed hearing loss with large ABG   - absent acoustic reflexes    Date - 12/9/24      IMAGING  CT Temporal Bone   CTA Head and Neck   - both studies reviewed  CT ANGIOGRAPHY HEAD/NECK:  1. No clear vascular malformation in the posterior fossa to explain  the clinical complaint of tinnitus.  2. Subtle 2 mm outpouching observed of the left C6 (ophthalmic)  segment of the internal carotid artery, somewhat limited in  visualization secondary to adjacent osseous structures. Oblique  reformations suggestive of possible aneurysm versus ophthalmic artery  infundibulum. Catheter angiography may be of further used to assess  this lesion. Otherwise, unremarkable angiographic assessment of the  great vessels of the head.  CT TEMPORAL BONE  IMPRESSION:  1. Soft tissue within the right tympanic cavity as well as right  mastoid effusion without associated soft tissue mass or osseous  erosion. Findings may represent sequela of otomastoiditis. No  definitive evidence of cholesteatoma.  2. Left temporal bone is unremarkable.  3. Unerupted right maxillary molar with extension of large mixed  osseous and soft tissue bed extending into the right maxillary sinus.    OTHER  - none    PROCEDURE:    ASSESSMENT & PLAN:  Problem List Items Addressed This Visit          ENT    Mixed hearing loss, bilateral - Primary    Pulsatile tinnitus     Formal angiogram with NSGY pending.  - will follow-up their results    Hearing loss is likely from otosclerosis. Right > left.  I discussed the pathophysiology of otosclerosis, which is a primary disorder of the otic capsule bone and most commonly effects the oval window at the anterior aspect of the stapes. This condition usually begins in the 3rd or 4th decade of life and is usually progressive. It commonly effects both ears but not always symmetrically. The treatment options  include observation, hearing aids, or surgery. Amplification with a hearing aid is typically the first line option and poses no risks. Medical management with sodium fluoride or bisphosphonate is reserved for more advanced disease with a significant sensorineural component or radiographic involvement of the cochlea. I also discussed stapedectomy which has a 90% success rate in improving hearing within a 10 dB air-bone gap. This is an outpatient procedure. The risks of surgery include taste disturbance, facial paralysis, hearing loss which can be profound and permanent (1%), dizziness which is usually transient, tinnitus, prosthesis failure or recurrence of the hearing loss. Most results are stable over time but the surgery will not address any sensorineural component of their hearing loss.  -proceed with scheduling right endoscopic stapedotomy with CO2 laser; may need to adjust date if intervention is needed following angiogram    Arnie Pa M.D.            Otology/Neurotology      Department of Otolaryngology - Head and Neck Surgery     [facility]     Phone/Appointments: [phone]      Fax: [phone]      E-mail: [email or web address]

## 2025-01-13 NOTE — LETTER
January 13, 2025     Crystal Gonzalez MD  [id number]  Department Of Neurological Surgery  [facility] [address]    Patient: Jaime Garcia   YOB: 1975   Date of Visit: 1/13/2025       Dear Dr. Crystal Gonzalez MD:    I recently saw Jaime Garcia.  In addition to his left-sided pulsatile tinnitus, the patient has longstanding bilateral mixed hearing loss likely from otosclerosis. I will be curious to see your results from this patient's angiogram on January 28.  Pending those results, I may proceed with a right ear surgery but this is elective and your findings will take priority.    I also wanted to introduce myself as one of the new neurotologists with the otolaryngology department.  I do have a particular interest in pulsatile tinnitus management/workup and have published on the topic in the past. At [facility] I worked closely with the Neuro-IR team to develop a pulsatile tinnitus care path and help with co-management.  Please let me know if I can be of any assistance to you or your patients.  My cell phone is [phone] and my email is [email or web address].    Sincerely,     Arnie Pa MD      CC: Rosana Handy, APRN-CNP  Michele Sahu MD  ______________________________________________________________________________________    Chief Complaint: hearing loss and pulsatile tinnitus  Referred by: Rosana Handy, APR*     HISTORY OF PRESENT ILLNESS: This is a 48 yo male who presents today for hearing loss and pulsatile tinnitus. Notes from Rosana Handy reviewed. Imaging reviewed. Family history of aneurysm noted.     Pulsatile tinnitus: Reports constant whooshing noise in the left ear.  Dates back to perhaps 2017, at which time he suffered some head trauma.  No prior workup.  Nothing makes it better.  Patient is very bothered by it.  Recent imaging identified possible ophthalmic aneurysm.  Formal angiogram with neurosurgery is pending.    Hearing loss: Progressive, bilateral.  Right ear is  worse.  Hearing loss is causing significant decrease in his quality of life and negatively impacts his work.  He is not particularly interested in hearing aids.  Unclear if there is a family history.  No major history of vertigo or dizziness.  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 He has had left pulsatile tinnitus constant since 2017.  He got sick the weekend after Thanksgiving. He coughed so hard he perforated the right ear drum. He feels he is in a tunnel now on that side. No real ear pain or drainage. Denies vertigo, did have some in the past.  Denies previous ear surgery. Admits to loud noise exposure. No known family history of hearing loss.     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 Take before meals., Disp: , Rfl:   •  mupirocin (Bactroban) 2 % ointment, Apply a pea sized amount to the pad of the thumb, swipe into the nostril, sniff, then pinch the nose 2-3 times daily. Do NOT use a Q-tip., Disp: 30 g, Rfl: 0  •  polyethylene glycol-electrolytes (Golytely) 420 gram solution, Begin drinking first half of prep 6pm the night before procedure. Start second half 5 hours before procedure time. (Patient not taking: Reported on 12/11/2024), Disp: 4000 mL, Rfl: 0    Review of patient's allergies indicates:  No Known Allergies    Social History and Family History: reviewed in the EMR    New patients fill out a 10-system review of systems survey that gets reviewed at their initial visit. Pertinent positives have been incorporated into the HPI.    PHYSICAL EXAM:   Vitals:    01/13/25  "0855   Weight: 106 kg (234 lb 9.6 oz)   Height: 1.727 m (5' 8\")     The patient is well-developed, well-nourished and in no acute distress.    The patient is alert and oriented to time, person, and place with appropriate mood and affect.     EARS: Examination of the external ears was normal using visual inspection. Handheld otoscopy was inadequate to provide fine detail and depth perception necessary for a full diagnostic assessment of the tympanic membrane and middle ear space. The otologic microscope was utilized to improve visualization. Use of the otologic microscope facilitates cleaning of the EAC and three-dimensional, magnified visualization of the tympanic membrane and middle ear structures for diagnosis of observable pathology and anatomical variations. Otoscopic and/or microscopic evaluation reveals:        Right:  External auditory canal: patent   Tympanic Membrane: intact > mobile with autoinsufflation (no residual perforation)  Middle ear: well aerated       Left: External auditory canal: patent   Tympanic Membrane: intact and normal   Middle ear: well aerated     512-Hz Tuning Fork Exam   Traore: right   Rinne: Right - AC < BC; Left - AC < BC    Eyes:  EOMI, vision grossly intact, no nystagmus   Neurologic:  CN 2-12 intact including normal facial movement and sensation     DATA REVIEWED:   AUDIOGRAMS   Date - 07/2024   - reviewed, also shows bilateral mod-severe mixed hearing loss with large ABG   - absent acoustic reflexes    Date - 12/9/24      IMAGING  CT Temporal Bone   CTA Head and Neck   - both studies reviewed  CT ANGIOGRAPHY HEAD/NECK:  1. No clear vascular malformation in the posterior fossa to explain  the clinical complaint of tinnitus.  2. Subtle 2 mm outpouching observed of the left C6 (ophthalmic)  segment of the internal carotid artery, somewhat limited in  visualization secondary to adjacent osseous structures. Oblique  reformations suggestive of possible aneurysm versus ophthalmic " artery  infundibulum. Catheter angiography may be of further used to assess  this lesion. Otherwise, unremarkable angiographic assessment of the  great vessels of the head.  CT TEMPORAL BONE  IMPRESSION:  1. Soft tissue within the right tympanic cavity as well as right  mastoid effusion without associated soft tissue mass or osseous  erosion. Findings may represent sequela of otomastoiditis. No  definitive evidence of cholesteatoma.  2. Left temporal bone is unremarkable.  3. Unerupted right maxillary molar with extension of large mixed  osseous and soft tissue bed extending into the right maxillary sinus.    OTHER  - none    PROCEDURE:    ASSESSMENT & PLAN:  Problem List Items Addressed This Visit          ENT    Mixed hearing loss, bilateral - Primary    Pulsatile tinnitus     Formal angiogram with NSGY pending.  - will follow-up their results    Hearing loss is likely from otosclerosis. Right > left.  I discussed the pathophysiology of otosclerosis, which is a primary disorder of the otic capsule bone and most commonly effects the oval window at the anterior aspect of the stapes. This condition usually begins in the 3rd or 4th decade of life and is usually progressive. It commonly effects both ears but not always symmetrically. The treatment options include observation, hearing aids, or surgery. Amplification with a hearing aid is typically the first line option and poses no risks. Medical management with sodium fluoride or bisphosphonate is reserved for more advanced disease with a significant sensorineural component or radiographic involvement of the cochlea. I also discussed stapedectomy which has a 90% success rate in improving hearing within a 10 dB air-bone gap. This is an outpatient procedure. The risks of surgery include taste disturbance, facial paralysis, hearing loss which can be profound and permanent (1%), dizziness which is usually transient, tinnitus, prosthesis failure or recurrence of the hearing  loss. Most results are stable over time but the surgery will not address any sensorineural component of their hearing loss.  -proceed with scheduling right endoscopic stapedotomy with CO2 laser; may need to adjust date if intervention is needed following angiogram    Arnie Pa M.D.            Otology/Neurotology      Department of Otolaryngology - Head and Neck Surgery     [facility]     Phone/Appointments: [phone]      Fax: [phone]      E-mail: [email or web address]

## 2025-01-13 NOTE — H&P (VIEW-ONLY)
Chief Complaint: hearing loss and pulsatile tinnitus  Referred by: Rosana Handy, APR*     HISTORY OF PRESENT ILLNESS: This is a 50 yo male who presents today for hearing loss and pulsatile tinnitus. Notes from Rosana Handy reviewed. Imaging reviewed. Family history of aneurysm noted.     Pulsatile tinnitus: Reports constant whooshing noise in the left ear.  Dates back to perhaps 2017, at which time he suffered some head trauma.  No prior workup.  Nothing makes it better.  Patient is very bothered by it.  Recent imaging identified possible ophthalmic aneurysm.  Formal angiogram with neurosurgery is pending.    Hearing loss: Progressive, bilateral.  Right ear is worse.  Hearing loss is causing significant decrease in his quality of life and negatively impacts his work.  He is not particularly interested in hearing aids.  Unclear if there is a family history.  No major history of vertigo or dizziness.  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 He has had left pulsatile tinnitus constant since 2017.  He got sick the weekend after Thanksgiving. He coughed so hard he perforated the right ear drum. He feels he is in a tunnel now on that side. No real ear pain or drainage. Denies vertigo, did have some in the past.  Denies previous ear surgery. Admits to loud noise exposure. No known family history of hearing loss.     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 Take before meals., Disp: , Rfl:     mupirocin (Bactroban) 2 % ointment, Apply a pea sized amount to the pad of the thumb, swipe  "into the nostril, sniff, then pinch the nose 2-3 times daily. Do NOT use a Q-tip., Disp: 30 g, Rfl: 0    polyethylene glycol-electrolytes (Golytely) 420 gram solution, Begin drinking first half of prep 6pm the night before procedure. Start second half 5 hours before procedure time. (Patient not taking: Reported on 12/11/2024), Disp: 4000 mL, Rfl: 0    Review of patient's allergies indicates:  No Known Allergies    Social History and Family History: reviewed in the EMR    New patients fill out a 10-system review of systems survey that gets reviewed at their initial visit. Pertinent positives have been incorporated into the HPI.    PHYSICAL EXAM:   Vitals:    01/13/25 0855   Weight: 106 kg (234 lb 9.6 oz)   Height: 1.727 m (5' 8\")     The patient is well-developed, well-nourished and in no acute distress.    The patient is alert and oriented to time, person, and place with appropriate mood and affect.     EARS: Examination of the external ears was normal using visual inspection. Handheld otoscopy was inadequate to provide fine detail and depth perception necessary for a full diagnostic assessment of the tympanic membrane and middle ear space. The otologic microscope was utilized to improve visualization. Use of the otologic microscope facilitates cleaning of the EAC and three-dimensional, magnified visualization of the tympanic membrane and middle ear structures for diagnosis of observable pathology and anatomical variations. Otoscopic and/or microscopic evaluation reveals:        Right:  External auditory canal: patent   Tympanic Membrane: intact > mobile with autoinsufflation (no residual perforation)  Middle ear: well aerated       Left: External auditory canal: patent   Tympanic Membrane: intact and normal   Middle ear: well aerated     512-Hz Tuning Fork Exam   Traore: right   Rinne: Right - AC < BC; Left - AC < BC    Eyes:  EOMI, vision grossly intact, no nystagmus   Neurologic:  CN 2-12 intact including normal " facial movement and sensation     DATA REVIEWED:   AUDIOGRAMS   Date - 07/2024   - reviewed, also shows bilateral mod-severe mixed hearing loss with large ABG   - absent acoustic reflexes    Date - 12/9/24      IMAGING  CT Temporal Bone   CTA Head and Neck   - both studies reviewed  CT ANGIOGRAPHY HEAD/NECK:  1. No clear vascular malformation in the posterior fossa to explain  the clinical complaint of tinnitus.  2. Subtle 2 mm outpouching observed of the left C6 (ophthalmic)  segment of the internal carotid artery, somewhat limited in  visualization secondary to adjacent osseous structures. Oblique  reformations suggestive of possible aneurysm versus ophthalmic artery  infundibulum. Catheter angiography may be of further used to assess  this lesion. Otherwise, unremarkable angiographic assessment of the  great vessels of the head.  CT TEMPORAL BONE  IMPRESSION:  1. Soft tissue within the right tympanic cavity as well as right  mastoid effusion without associated soft tissue mass or osseous  erosion. Findings may represent sequela of otomastoiditis. No  definitive evidence of cholesteatoma.  2. Left temporal bone is unremarkable.  3. Unerupted right maxillary molar with extension of large mixed  osseous and soft tissue bed extending into the right maxillary sinus.    OTHER  - none    PROCEDURE:    ASSESSMENT & PLAN:  Problem List Items Addressed This Visit          ENT    Mixed hearing loss, bilateral - Primary    Pulsatile tinnitus     Formal angiogram with NSGY pending.  - will follow-up their results    Hearing loss is likely from otosclerosis. Right > left.  I discussed the pathophysiology of otosclerosis, which is a primary disorder of the otic capsule bone and most commonly effects the oval window at the anterior aspect of the stapes. This condition usually begins in the 3rd or 4th decade of life and is usually progressive. It commonly effects both ears but not always symmetrically. The treatment options  include observation, hearing aids, or surgery. Amplification with a hearing aid is typically the first line option and poses no risks. Medical management with sodium fluoride or bisphosphonate is reserved for more advanced disease with a significant sensorineural component or radiographic involvement of the cochlea. I also discussed stapedectomy which has a 90% success rate in improving hearing within a 10 dB air-bone gap. This is an outpatient procedure. The risks of surgery include taste disturbance, facial paralysis, hearing loss which can be profound and permanent (1%), dizziness which is usually transient, tinnitus, prosthesis failure or recurrence of the hearing loss. Most results are stable over time but the surgery will not address any sensorineural component of their hearing loss.  -proceed with scheduling right endoscopic stapedotomy with CO2 laser; may need to adjust date if intervention is needed following angiogram    Arnie Pa M.D.            Otology/Neurotology      Department of Otolaryngology - Head and Neck Surgery     [facility]     Phone/Appointments: [phone]      Fax: [phone]      E-mail: [email or web address]

## 2025-01-21 NOTE — PREPROCEDURE INSTRUCTIONS
Pre-Op Instructions &?Checklist       Your surgery has been scheduled at [facility] at [address] [place], [address], [address], in the [facility]. Parking is to the left of the main entrance.      You will be contacted about the time of your surgery the day before your surgery (if your surgery is on a Monday, you will be called the Friday before surgery). If you are unable to answer the phone, a detailed voicemail message will be left. Make sure that your voicemail box is not full so a message can be left. If you have not received a call by 3:00 pm you may call [phone] between the hours of 3:00 and 4:00 pm. Please be available by phone the night before/day of surgery in case there is a change in the schedule which may require you to arrive earlier/later.      ?      14 DAYS BEFORE SURGERY STOP TAKING WEIGHT LOSS MEDICATIONS       ?7 DAYS BEFORE SURGERY STOP THESE MEDICATIONS:       * Multiple Vitamins containing Vitamin E       * Herbal supplements, Fish Oil, garlic pills, turmeric, CoQ enzyme       *Stop taking aspirin, aspirin-containing products, and NSAID's like Advil, Motrin, Aleve, Ibuprofen, Meloxicam, Celecoxib, and Diclofenac. Tylenol is okay to take for pain relief.        *If you are currently taking Coumadin/Warfarin, we will have to coordinate that with your PCP &/or the Anticoagulation Clinic.      THE DAY BEFORE SURGERY:       *Do not eat any food after midnight the night before surgery.        *You are permitted to have no more than 4 ounces of clear liquids such as water, apple juice, plain tea or coffee (no milk or creamer), clear electrolyte-replenishing drinks such as Pedialyte, Gatorade, or         Powerade  (not yogurt or pulp-containing smoothies or juices such as orange juice) up to 3 hours before your arrival time.      DAY OF SURGERY TAKE THESE MEDICATIONS (if it is not listed, do not take it.)    There are no medications for you to take on the  morning of surgery.     ON THE MORNING OF SURGERY:       *Shower either the night before your surgery or the morning of your surgery       *Do not use moisturizers, creams, lotions or perfume, or make-up.       *Wear comfortable, loose fitting clothing.        *All jewelry and valuables should be left at home.       *Prosthetic devices such as contact lenses, hearing aids, dentures, eyelash extensions, hairpins and body piercings must be removed before surgery. Bring containers for eyeglasses/contacts, dentures, or         hearing aids with you.      ? Diabetics: Please check fasting blood sugars upon waking up. ?If fasting blood sugars are <80ml/dl, please drink 100ml/3oz. of apple juice no later than 2 hours prior to surgery.      ?BRING WITH YOU:        *Photo ID and insurance card       *Current list of medicines and allergies       *Pacemaker/Defibrillator/Heart stent cards       *Copy of your complete Advanced Directive/DHPOA-if applicable      ?SMOKING:       *Quitting smoking can make a huge difference to your health and recovery from surgery. ?       *If you need help with quitting, call 9-490PaintZenQUIT-NOW.        ALCOHOL:       *No alcoholic beverages for 48 hours before surgery.      ?AFTER OUTPATIENT SURGERY:       *A responsible adult MUST accompany you at the time of discharge and stay with you for 24 hours after your surgery.       *You may NOT drive yourself home after surgery.       *You may use a taxi or ride sharing service ([employer], Uber) to return home ONLY if you are accompanied by a friend or family member       *Instructions for resuming your medications will be provided by your surgeon.      CONTACT SURGEON'S OFFICE IF YOU DEVELOP:       *Fever =/>?100.4 F        *New respiratory symptoms (e.g. cough, shortness of breath, respiratory distress, sore throat)       *Recent loss of taste or smell       *Flu like symptoms such as headache, fatigue or gastrointestinal symptoms       *If you develop any  open sores, shingles, burning or painful urination    AND/OR:       *You no longer wish to have the surgery.       *Any other personal circumstances change that may lead to the need to cancel or defer this surgery.       *You were admitted to any hospital within one week of your planned procedure.      ?If you have any questions regarding these preoperative instructions, you may call [phone]. If you have questions regarding you surgical procedure, or post-operative care/recovery please call your surgeon's office.      Link to [facility] Laboratory Services Locations   [email or web address]      Link to [facility]   [email or web address]

## 2025-01-21 NOTE — CPM/PAT H&P
CPM/PAT Evaluation       Name: Jaime Garcia   /Age: 1975       TELEMEDICINE ENCOUNTER  Patient was interviewed by telephone for preadmission testing perioperative risk assessment prior to surgery.    DATE OF CONSULT: 2025  REFERRING PROVIDER: Dr. Arnie Pa  SURGERY, DATE, AND LENGTH: Right endoscopic stapedectomy with CO2 laser; 2025; 150 minutes    CHIEF COMPLAINT  Mixed hearing loss    HPI  Jaime Garcia is a 49-year-old male complaining of gradual hearing loss particularly on his right side.  He reports that the hearing loss is decreasing the quality of his life and negatively impacting his work.  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  He is not interested in wearing hearing aids.  He denies vertigo or dizziness.  He reports having pulsatile tinnitus since 2017 that began after sustaining a head trauma.  Patient has never had any formal workup.  A recent Head and Neck CT Angiography done on 2024 demonstrated a subtle 2 mm outpouching observed of the left C6 (ophthalmic) segment of the internal carotid artery, somewhat limited in visualization secondary to adjacent osseous structures.  Patient scheduled for cerebral angiogram on 2025 for further evaluation. He is followed by neurology, Dr. Crystal Gonzalez.  Patient referred to preadmission testing in anticipation of a right endoscopic stapedectomy with CO2 laser.    ACTIVE PROBLEMS  Patient Active Problem List   Diagnosis    Decreased hearing    Class 1 obesity due to excess calories without serious comorbidity with body mass index (BMI) of 34.0 to 34.9 in adult    Elevated glucose    Paronychia of right little finger    Thrombocytopenia (CMS-HCC)    BEATRICE (obstructive sleep apnea)    Mixed hearing loss, bilateral    Pulsatile tinnitus    Otosclerosis of both ears        PAST MEDICAL HISTORY  Past Medical History:   Diagnosis Date    Accessory ureter     states 2  ureters on right side    HL (hearing loss)         SURGICAL HISTORY  Past Surgical History:   Procedure Laterality Date    APPENDECTOMY      COLONOSCOPY          ANESTHESIA HISTORY  Denies problems with anesthesia in the past such as PONV, prolonged sedation, awareness, dental damage, aspiration, cardiac arrest, difficult intubation, or unexpected hospital admissions. Denies family history of malignant hyperthermia, or pseudocholinesterase deficiency.     SOCIAL HISTORY  Former cigarette smoker quit 2 years ago; EtOH: About 20 drinks a week; occasional marijuana use in the form of edibles; no other recreational drug use.  Patient states he goes for a run 3-4 times a week for a mile at a time.  He states he is able to do moderate ADLs such as heavy housework, light yard work.  He denies chest pain or shortness of breath.  METS 4    FAMILY HISTORY  Family History   Problem Relation Name Age of Onset    Diverticulitis Father      Diabetes Paternal Grandmother          ALLERGIES  No Known Allergies     MEDICATIONS  No current facility-administered medications for this encounter.    Current Outpatient Medications:     esomeprazole (NexIUM) 40 mg packet, Take 20 mg by mouth once daily in the morning. Take before meals., Disp: , Rfl:     mupirocin (Bactroban) 2 % ointment, Apply a pea sized amount to the pad of the thumb, swipe into the nostril, sniff, then pinch the nose 2-3 times daily. Do NOT use a Q-tip., Disp: 30 g, Rfl: 0    polyethylene glycol-electrolytes (Golytely) 420 gram solution, Begin drinking first half of prep 6pm the night before procedure. Start second half 5 hours before procedure time. (Patient not taking: Reported on 12/11/2024), Disp: 4000 mL, Rfl: 0     REVIEW OF SYSTEMS  Review of Systems   HENT:  Positive for hearing loss.         Pulsatile tinnitus   Hematological:         Thrombocytopenia with platelets 140 on 07/09/2024   All other systems reviewed and are negative.    STOP BANG:  Positive for  BEATRICE determine from sleep study conducted on 08/17/2024.  Patient states he is awaiting delivery of CPAP machine.    PHYSICAL EXAM  Deferred    AIRWAY EXAM  Deferred    VITALS  No vitals taken for telemedicine visit  BMI Readings from Last 1 Encounters:   01/13/25 35.67 kg/m²      BP Readings from Last 4 Encounters:   01/07/25 108/74   12/11/24 118/76   12/03/24 116/81   07/31/24 114/74        LABS  Lab Results   Component Value Date    WBC 8.0 07/09/2024    HGB 14.9 07/09/2024    HCT 41.8 07/09/2024    MCV 85 07/09/2024     (L) 07/09/2024      Lab Results   Component Value Date    GLUCOSE 167 (H) 07/09/2024    CALCIUM 9.4 07/09/2024     07/09/2024    K 3.9 07/09/2024    CO2 24 07/09/2024     07/09/2024    BUN 14 07/09/2024    CREATININE 0.99 07/09/2024      Lab Results   Component Value Date    HGBA1C 5.4 05/09/2024      Lab Results   Component Value Date    CHOL 231 (H) 05/09/2024     Lab Results   Component Value Date    HDL 55.5 05/09/2024     Lab Results   Component Value Date    LDLCALC 161 (H) 05/09/2024     Lab Results   Component Value Date    TRIG 74 05/09/2024     CBC, CMP order placed on 01/21/2025.  Patient verbally acknowledged lab work is to be completed before surgery         ASSESSMENT/PLAN  Mixed hearing loss  Right sided endoscopic stapedectomy with CO2 laser      Preoperative instructions reviewed in detail with patient during this encounter. A copy of these instructions has been unloaded to [facility] along with a copy sent to either home email address or mailed to home address.  This note was created in part upon personal review of patient's medical records.  Speech recognition transcription software was used in the creation of this note. Despite proofreading, several typographical errors might be present that might affect the meaning of the content.

## 2025-01-22 ENCOUNTER — LAB (OUTPATIENT)
Dept: LAB | Facility: LAB | Age: 50
End: 2025-01-22
Payer: COMMERCIAL

## 2025-01-22 DIAGNOSIS — H80.93 OTOSCLEROSIS OF BOTH EARS: ICD-10-CM

## 2025-01-22 DIAGNOSIS — Z41.9 ELECTIVE SURGERY: ICD-10-CM

## 2025-01-22 DIAGNOSIS — H93.A2 PULSATILE TINNITUS OF LEFT EAR: ICD-10-CM

## 2025-01-22 DIAGNOSIS — D69.6 PLATELETS DECREASED (CMS-HCC): ICD-10-CM

## 2025-01-22 LAB
ALBUMIN SERPL BCP-MCNC: 4.6 G/DL (ref 3.4–5)
ALP SERPL-CCNC: 71 U/L (ref 33–120)
ALT SERPL W P-5'-P-CCNC: 43 U/L (ref 10–52)
ANION GAP SERPL CALC-SCNC: 14 MMOL/L (ref 10–20)
AST SERPL W P-5'-P-CCNC: 25 U/L (ref 9–39)
BASOPHILS # BLD AUTO: 0.04 X10*3/UL (ref 0–0.1)
BASOPHILS NFR BLD AUTO: 0.5 %
BILIRUB SERPL-MCNC: 0.7 MG/DL (ref 0–1.2)
BUN SERPL-MCNC: 12 MG/DL (ref 6–23)
CALCIUM SERPL-MCNC: 9.6 MG/DL (ref 8.6–10.6)
CHLORIDE SERPL-SCNC: 104 MMOL/L (ref 98–107)
CO2 SERPL-SCNC: 25 MMOL/L (ref 21–32)
CREAT SERPL-MCNC: 1.02 MG/DL (ref 0.5–1.3)
EGFRCR SERPLBLD CKD-EPI 2021: 89 ML/MIN/1.73M*2
EOSINOPHIL # BLD AUTO: 0.14 X10*3/UL (ref 0–0.7)
EOSINOPHIL NFR BLD AUTO: 1.8 %
ERYTHROCYTE [DISTWIDTH] IN BLOOD BY AUTOMATED COUNT: 13.1 % (ref 11.5–14.5)
FERRITIN SERPL-MCNC: 103 NG/ML (ref 20–300)
GLUCOSE SERPL-MCNC: 103 MG/DL (ref 74–99)
HCT VFR BLD AUTO: 42 % (ref 41–52)
HGB BLD-MCNC: 14.8 G/DL (ref 13.5–17.5)
IMM GRANULOCYTES # BLD AUTO: 0.03 X10*3/UL (ref 0–0.7)
IMM GRANULOCYTES NFR BLD AUTO: 0.4 % (ref 0–0.9)
INR PPP: 1 (ref 0.9–1.1)
IRON SATN MFR SERPL: 21 % (ref 25–45)
IRON SERPL-MCNC: 79 UG/DL (ref 35–150)
LYMPHOCYTES # BLD AUTO: 1.37 X10*3/UL (ref 1.2–4.8)
LYMPHOCYTES NFR BLD AUTO: 17.5 %
MCH RBC QN AUTO: 31 PG (ref 26–34)
MCHC RBC AUTO-ENTMCNC: 35.2 G/DL (ref 32–36)
MCV RBC AUTO: 88 FL (ref 80–100)
MONOCYTES # BLD AUTO: 0.72 X10*3/UL (ref 0.1–1)
MONOCYTES NFR BLD AUTO: 9.2 %
NEUTROPHILS # BLD AUTO: 5.54 X10*3/UL (ref 1.2–7.7)
NEUTROPHILS NFR BLD AUTO: 70.6 %
NRBC BLD-RTO: 0 /100 WBCS (ref 0–0)
PLATELET # BLD AUTO: 149 X10*3/UL (ref 150–450)
POTASSIUM SERPL-SCNC: 4 MMOL/L (ref 3.5–5.3)
PROT SERPL-MCNC: 7.2 G/DL (ref 6.4–8.2)
PROTHROMBIN TIME: 11.8 SECONDS (ref 9.8–12.8)
RBC # BLD AUTO: 4.78 X10*6/UL (ref 4.5–5.9)
SODIUM SERPL-SCNC: 139 MMOL/L (ref 136–145)
TIBC SERPL-MCNC: 376 UG/DL (ref 240–445)
UIBC SERPL-MCNC: 297 UG/DL (ref 110–370)
VIT B12 SERPL-MCNC: 385 PG/ML (ref 211–911)
WBC # BLD AUTO: 7.8 X10*3/UL (ref 4.4–11.3)

## 2025-01-22 PROCEDURE — 83540 ASSAY OF IRON: CPT

## 2025-01-22 PROCEDURE — 85610 PROTHROMBIN TIME: CPT

## 2025-01-22 PROCEDURE — 80053 COMPREHEN METABOLIC PANEL: CPT

## 2025-01-22 PROCEDURE — 83550 IRON BINDING TEST: CPT

## 2025-01-22 PROCEDURE — 82607 VITAMIN B-12: CPT

## 2025-01-22 PROCEDURE — 85025 COMPLETE CBC W/AUTO DIFF WBC: CPT

## 2025-01-22 PROCEDURE — 82728 ASSAY OF FERRITIN: CPT

## 2025-01-28 ENCOUNTER — HOSPITAL ENCOUNTER (OUTPATIENT)
Dept: RADIOLOGY | Facility: HOSPITAL | Age: 50
Discharge: HOME | End: 2025-01-28
Payer: COMMERCIAL

## 2025-01-28 VITALS
RESPIRATION RATE: 20 BRPM | TEMPERATURE: 98.8 F | HEIGHT: 68 IN | SYSTOLIC BLOOD PRESSURE: 118 MMHG | WEIGHT: 230 LBS | HEART RATE: 98 BPM | OXYGEN SATURATION: 98 % | DIASTOLIC BLOOD PRESSURE: 76 MMHG | BODY MASS INDEX: 34.86 KG/M2

## 2025-01-28 DIAGNOSIS — R93.89 ABNORMAL COMPUTED TOMOGRAPHY ANGIOGRAPHY (CTA): ICD-10-CM

## 2025-01-28 PROCEDURE — 76377 3D RENDER W/INTRP POSTPROCES: CPT | Performed by: NEUROLOGICAL SURGERY

## 2025-01-28 PROCEDURE — 96373 THER/PROPH/DIAG INJ IA: CPT | Performed by: NEUROLOGICAL SURGERY

## 2025-01-28 PROCEDURE — 99152 MOD SED SAME PHYS/QHP 5/>YRS: CPT | Performed by: STUDENT IN AN ORGANIZED HEALTH CARE EDUCATION/TRAINING PROGRAM

## 2025-01-28 PROCEDURE — 99153 MOD SED SAME PHYS/QHP EA: CPT | Performed by: STUDENT IN AN ORGANIZED HEALTH CARE EDUCATION/TRAINING PROGRAM

## 2025-01-28 PROCEDURE — 2550000001 HC RX 255 CONTRASTS: Performed by: NEUROLOGICAL SURGERY

## 2025-01-28 PROCEDURE — 2500000004 HC RX 250 GENERAL PHARMACY W/ HCPCS (ALT 636 FOR OP/ED): Performed by: NEUROLOGICAL SURGERY

## 2025-01-28 PROCEDURE — 36227 PLACE CATH XTRNL CAROTID: CPT | Performed by: NEUROLOGICAL SURGERY

## 2025-01-28 PROCEDURE — 36224 PLACE CATH CAROTD ART: CPT | Performed by: NEUROLOGICAL SURGERY

## 2025-01-28 PROCEDURE — 2720000007 HC OR 272 NO HCPCS: Performed by: STUDENT IN AN ORGANIZED HEALTH CARE EDUCATION/TRAINING PROGRAM

## 2025-01-28 PROCEDURE — 7100000009 HC PHASE TWO TIME - INITIAL BASE CHARGE: Performed by: STUDENT IN AN ORGANIZED HEALTH CARE EDUCATION/TRAINING PROGRAM

## 2025-01-28 PROCEDURE — 7100000010 HC PHASE TWO TIME - EACH INCREMENTAL 1 MINUTE: Performed by: STUDENT IN AN ORGANIZED HEALTH CARE EDUCATION/TRAINING PROGRAM

## 2025-01-28 PROCEDURE — 2780000003 HC OR 278 NO HCPCS: Performed by: STUDENT IN AN ORGANIZED HEALTH CARE EDUCATION/TRAINING PROGRAM

## 2025-01-28 PROCEDURE — C1769 GUIDE WIRE: HCPCS | Performed by: STUDENT IN AN ORGANIZED HEALTH CARE EDUCATION/TRAINING PROGRAM

## 2025-01-28 PROCEDURE — C1760 CLOSURE DEV, VASC: HCPCS | Performed by: STUDENT IN AN ORGANIZED HEALTH CARE EDUCATION/TRAINING PROGRAM

## 2025-01-28 PROCEDURE — 99152 MOD SED SAME PHYS/QHP 5/>YRS: CPT | Performed by: NEUROLOGICAL SURGERY

## 2025-01-28 RX ORDER — MIDAZOLAM HYDROCHLORIDE 1 MG/ML
INJECTION INTRAMUSCULAR; INTRAVENOUS
Status: COMPLETED | OUTPATIENT
Start: 2025-01-28 | End: 2025-01-28

## 2025-01-28 RX ORDER — FENTANYL CITRATE 50 UG/ML
INJECTION, SOLUTION INTRAMUSCULAR; INTRAVENOUS
Status: COMPLETED | OUTPATIENT
Start: 2025-01-28 | End: 2025-01-28

## 2025-01-28 RX ADMIN — MIDAZOLAM HYDROCHLORIDE 1 MG: 1 INJECTION, SOLUTION INTRAMUSCULAR; INTRAVENOUS at 11:37

## 2025-01-28 RX ADMIN — MIDAZOLAM HYDROCHLORIDE 1 MG: 1 INJECTION, SOLUTION INTRAMUSCULAR; INTRAVENOUS at 11:46

## 2025-01-28 RX ADMIN — IOHEXOL 100 ML: 350 INJECTION, SOLUTION INTRAVENOUS at 12:03

## 2025-01-28 RX ADMIN — FENTANYL CITRATE 50 MCG: 50 INJECTION, SOLUTION INTRAMUSCULAR; INTRAVENOUS at 11:37

## 2025-01-28 ASSESSMENT — PAIN SCALES - GENERAL

## 2025-01-28 ASSESSMENT — PAIN - FUNCTIONAL ASSESSMENT
PAIN_FUNCTIONAL_ASSESSMENT: 0-10

## 2025-01-28 NOTE — PROCEDURES
Pre-Procedure Verification and Time Out:  Procedure Location: procedure area  HUDDLE - Pre-procedure Verification:  completed  TIME OUT - Final Verification:  completed immediately prior to procedure start  DEBRIEF: completed    Complications:  None; patient tolerated the procedure well.     Disposition: rPCU  Condition: stable  Specimens Collected: No specimens collected    General Information:   Anesthesia/ sedation: Non-Anesthesia  Indication(s)/Pre - Procedure Diagnoses: Cerebral Aneurysm   Post-Procedure Diagnosis: Normal cerebral angiogram   Procedure Name: Diagnostic Cerebral Angiogram  Procedure performed by: Crystal Gonzalez   Assistant(s): Dustin Armas   Estimated Blood Loss (mL): 10  Specimen: no  Informed Consent: consent obtained and in chart     Access: 5 Fr Sheath in R CFA  Closure: Mynx  Vessels Injected: L ICA, L ECA, R CFA   Moderate Sedation Time: 20 min   Findings: no cerebral aneurysm or dural arteriovenous fistula visualized, normal cerebral angiogram, Full report to follow in PACS dictation

## 2025-01-28 NOTE — Clinical Note
Cerebral angiogram performed. Access via right groin. Closure with Mynx at 12:15. 2x2 and Tegaderm placed. Dressing clean, dry, and intact. No hematoma. Pt received 1 mg versed and 50 mcg fentanyl IVP. Pt to keep R leg straight for 3 hours and head of bed is not to go above 30 degrees. Pt verbalized understanding. VSS. Pt transferred to [facility], [facility] RN received report.

## 2025-01-28 NOTE — DISCHARGE INSTRUCTIONS
- Follow instructions on Angiogram patient info sheet and Mynx ID card.    - Keep bandage on groin clean and dry for 24-48 hrs, then may remove and shower. No soaking for 5 days until site is fully scabbed over. Monitor for signs of bleeding and infection. Call 911 for active bleeding. No heavy lifting, pushing or pulling for 3 days, nothing more than 10 lbs.      You received moderate sedation:  - Do not drive a car, or operate any machinery or power tools of any kind.  - Do not drink any alcoholic drinks.  - Do not take any over the counter medications that may cause drowsiness.  - Do not make any important decisions or sign any legal documents.  - You need to have a responsible adult accompany you home.  - You may resume your normal diet.  - We strongly suggest that a responsible adult be with you for the rest of the day and also during the night. This is for your protection and safety.     For questions related to your procedure:  Please call [phone] between the hours of 7:00am-5:00pm Monday through Friday.  Please call [phone] after 5:00pm and on weekends and holidays.     In the event of an emergency call 911 or go to your nearest emergency room.

## 2025-01-28 NOTE — PRE-PROCEDURE NOTE
Pre-Procedure H&P     Provider Assessment:  Diagnosis/Reason for Procedure: Cerebral Aneurysm and Pulsatile Tinnitus   Procedure: Diagnostic Cerebral Angiogram  Medications Reviewed:   yes   Prophylatic Antibiotics Needed:   no    Neuro status: A&Ox3, moving all extremities full strength   Mouth Opening OK: yes   Neck Flexibility OK: yes   Sedation Plan: moderate sedation   COVID-19 Risk Consent:  Surgeon has reviewed key risks related to the risk of fide COVID-19 and if they contract COVID-19 what the risks are.

## 2025-01-30 NOTE — RESULT ENCOUNTER NOTE
I spoke with the patient in the PCU after the angio and informed the patient of the result.  Recommend no further imaging or followup since the angio is negative for vascular abnormalities

## 2025-02-04 ENCOUNTER — ANESTHESIA EVENT (OUTPATIENT)
Dept: OPERATING ROOM | Facility: CLINIC | Age: 50
End: 2025-02-04
Payer: COMMERCIAL

## 2025-02-05 ENCOUNTER — HOSPITAL ENCOUNTER (OUTPATIENT)
Facility: CLINIC | Age: 50
Setting detail: OUTPATIENT SURGERY
Discharge: HOME | End: 2025-02-05
Attending: OTOLARYNGOLOGY | Admitting: OTOLARYNGOLOGY
Payer: COMMERCIAL

## 2025-02-05 ENCOUNTER — ANESTHESIA (OUTPATIENT)
Dept: OPERATING ROOM | Facility: CLINIC | Age: 50
End: 2025-02-05
Payer: COMMERCIAL

## 2025-02-05 ENCOUNTER — APPOINTMENT (OUTPATIENT)
Dept: HEMATOLOGY/ONCOLOGY | Facility: CLINIC | Age: 50
End: 2025-02-05
Payer: COMMERCIAL

## 2025-02-05 VITALS
RESPIRATION RATE: 15 BRPM | HEIGHT: 68 IN | HEART RATE: 80 BPM | OXYGEN SATURATION: 97 % | BODY MASS INDEX: 34.41 KG/M2 | DIASTOLIC BLOOD PRESSURE: 86 MMHG | WEIGHT: 227.07 LBS | TEMPERATURE: 97.3 F | SYSTOLIC BLOOD PRESSURE: 130 MMHG

## 2025-02-05 DIAGNOSIS — H90.6 MIXED HEARING LOSS, BILATERAL: Primary | ICD-10-CM

## 2025-02-05 DIAGNOSIS — H80.93 OTOSCLEROSIS OF BOTH EARS: ICD-10-CM

## 2025-02-05 DIAGNOSIS — G89.18 POST-OP PAIN: ICD-10-CM

## 2025-02-05 DIAGNOSIS — Z41.9 ELECTIVE SURGERY: ICD-10-CM

## 2025-02-05 PROBLEM — K27.9 PUD (PEPTIC ULCER DISEASE): Status: ACTIVE | Noted: 2025-02-05

## 2025-02-05 PROCEDURE — 3700000001 HC GENERAL ANESTHESIA TIME - INITIAL BASE CHARGE: Performed by: OTOLARYNGOLOGY

## 2025-02-05 PROCEDURE — 2500000004 HC RX 250 GENERAL PHARMACY W/ HCPCS (ALT 636 FOR OP/ED): Performed by: OTOLARYNGOLOGY

## 2025-02-05 PROCEDURE — L8613 OSSICULAR IMPLANT: HCPCS | Performed by: OTOLARYNGOLOGY

## 2025-02-05 PROCEDURE — 2780000003 HC OR 278 NO HCPCS: Performed by: OTOLARYNGOLOGY

## 2025-02-05 PROCEDURE — 3600000003 HC OR TIME - INITIAL BASE CHARGE - PROCEDURE LEVEL THREE: Performed by: OTOLARYNGOLOGY

## 2025-02-05 PROCEDURE — A69660 PR STAPEDECTOMY: Performed by: ANESTHESIOLOGY

## 2025-02-05 PROCEDURE — 3600000008 HC OR TIME - EACH INCREMENTAL 1 MINUTE - PROCEDURE LEVEL THREE: Performed by: OTOLARYNGOLOGY

## 2025-02-05 PROCEDURE — 7100000002 HC RECOVERY ROOM TIME - EACH INCREMENTAL 1 MINUTE: Performed by: OTOLARYNGOLOGY

## 2025-02-05 PROCEDURE — 69660 REVISE MIDDLE EAR BONE: CPT | Performed by: OTOLARYNGOLOGY

## 2025-02-05 PROCEDURE — 2500000004 HC RX 250 GENERAL PHARMACY W/ HCPCS (ALT 636 FOR OP/ED)

## 2025-02-05 PROCEDURE — 7100000010 HC PHASE TWO TIME - EACH INCREMENTAL 1 MINUTE: Performed by: OTOLARYNGOLOGY

## 2025-02-05 PROCEDURE — 7100000001 HC RECOVERY ROOM TIME - INITIAL BASE CHARGE: Performed by: OTOLARYNGOLOGY

## 2025-02-05 PROCEDURE — 2500000005 HC RX 250 GENERAL PHARMACY W/O HCPCS: Performed by: OTOLARYNGOLOGY

## 2025-02-05 PROCEDURE — A69660 PR STAPEDECTOMY

## 2025-02-05 PROCEDURE — 7100000009 HC PHASE TWO TIME - INITIAL BASE CHARGE: Performed by: OTOLARYNGOLOGY

## 2025-02-05 PROCEDURE — 2720000007 HC OR 272 NO HCPCS: Performed by: OTOLARYNGOLOGY

## 2025-02-05 PROCEDURE — 3700000002 HC GENERAL ANESTHESIA TIME - EACH INCREMENTAL 1 MINUTE: Performed by: OTOLARYNGOLOGY

## 2025-02-05 PROCEDURE — 2500000001 HC RX 250 WO HCPCS SELF ADMINISTERED DRUGS (ALT 637 FOR MEDICARE OP): Performed by: ANESTHESIOLOGY

## 2025-02-05 DEVICE — ECLIPSE PISTON 0.6MM DIA X 4.50MM L NITINOL/FLUOROPLASTIC
Type: IMPLANTABLE DEVICE | Site: EAR | Status: FUNCTIONAL
Brand: ECLIPSE PISTON

## 2025-02-05 RX ORDER — CEFAZOLIN 1 G/1
INJECTION, POWDER, FOR SOLUTION INTRAVENOUS AS NEEDED
Status: DISCONTINUED | OUTPATIENT
Start: 2025-02-05 | End: 2025-02-05

## 2025-02-05 RX ORDER — HYDROCODONE BITARTRATE AND ACETAMINOPHEN 5; 325 MG/1; MG/1
1 TABLET ORAL EVERY 6 HOURS PRN
Qty: 15 TABLET | Refills: 0 | Status: SHIPPED | OUTPATIENT
Start: 2025-02-05

## 2025-02-05 RX ORDER — DIPHENHYDRAMINE HYDROCHLORIDE 50 MG/ML
12.5 INJECTION INTRAMUSCULAR; INTRAVENOUS ONCE AS NEEDED
Status: DISCONTINUED | OUTPATIENT
Start: 2025-02-05 | End: 2025-02-05 | Stop reason: HOSPADM

## 2025-02-05 RX ORDER — SODIUM CHLORIDE, SODIUM LACTATE, POTASSIUM CHLORIDE, CALCIUM CHLORIDE 600; 310; 30; 20 MG/100ML; MG/100ML; MG/100ML; MG/100ML
50 INJECTION, SOLUTION INTRAVENOUS CONTINUOUS
Status: SHIPPED | OUTPATIENT
Start: 2025-02-05 | End: 2025-02-05

## 2025-02-05 RX ORDER — DOXYCYCLINE 100 MG/1
100 TABLET ORAL 2 TIMES DAILY
Qty: 10 TABLET | Refills: 0 | Status: SHIPPED | OUTPATIENT
Start: 2025-02-05 | End: 2025-02-05 | Stop reason: HOSPADM

## 2025-02-05 RX ORDER — EPINEPHRINE 1 MG/ML
INJECTION INTRAMUSCULAR; INTRAVENOUS; SUBCUTANEOUS AS NEEDED
Status: DISCONTINUED | OUTPATIENT
Start: 2025-02-05 | End: 2025-02-05 | Stop reason: HOSPADM

## 2025-02-05 RX ORDER — MIDAZOLAM HYDROCHLORIDE 1 MG/ML
INJECTION, SOLUTION INTRAMUSCULAR; INTRAVENOUS AS NEEDED
Status: DISCONTINUED | OUTPATIENT
Start: 2025-02-05 | End: 2025-02-05

## 2025-02-05 RX ORDER — OXYCODONE HYDROCHLORIDE 5 MG/1
5 TABLET ORAL EVERY 4 HOURS PRN
Status: DISCONTINUED | OUTPATIENT
Start: 2025-02-05 | End: 2025-02-05 | Stop reason: HOSPADM

## 2025-02-05 RX ORDER — DEXMEDETOMIDINE HYDROCHLORIDE 4 UG/ML
INJECTION, SOLUTION INTRAVENOUS CONTINUOUS PRN
Status: DISCONTINUED | OUTPATIENT
Start: 2025-02-05 | End: 2025-02-05

## 2025-02-05 RX ORDER — PROPOFOL 10 MG/ML
INJECTION, EMULSION INTRAVENOUS AS NEEDED
Status: DISCONTINUED | OUTPATIENT
Start: 2025-02-05 | End: 2025-02-05

## 2025-02-05 RX ORDER — CIPROFLOXACIN 2 MG/ML
INJECTION, SOLUTION INTRAVENOUS CONTINUOUS PRN
Status: COMPLETED | OUTPATIENT
Start: 2025-02-05 | End: 2025-02-05

## 2025-02-05 RX ORDER — SUCCINYLCHOLINE CHLORIDE 20 MG/ML
INJECTION INTRAMUSCULAR; INTRAVENOUS AS NEEDED
Status: DISCONTINUED | OUTPATIENT
Start: 2025-02-05 | End: 2025-02-05

## 2025-02-05 RX ORDER — OXYCODONE HYDROCHLORIDE 5 MG/1
10 TABLET ORAL EVERY 4 HOURS PRN
Status: DISCONTINUED | OUTPATIENT
Start: 2025-02-05 | End: 2025-02-05 | Stop reason: HOSPADM

## 2025-02-05 RX ORDER — MUPIROCIN 20 MG/G
OINTMENT TOPICAL AS NEEDED
Status: DISCONTINUED | OUTPATIENT
Start: 2025-02-05 | End: 2025-02-05 | Stop reason: HOSPADM

## 2025-02-05 RX ORDER — PHENYLEPHRINE HYDROCHLORIDE 10 MG/ML
INJECTION INTRAVENOUS AS NEEDED
Status: DISCONTINUED | OUTPATIENT
Start: 2025-02-05 | End: 2025-02-05

## 2025-02-05 RX ORDER — ONDANSETRON HYDROCHLORIDE 2 MG/ML
4 INJECTION, SOLUTION INTRAVENOUS ONCE AS NEEDED
Status: DISCONTINUED | OUTPATIENT
Start: 2025-02-05 | End: 2025-02-05 | Stop reason: HOSPADM

## 2025-02-05 RX ORDER — FENTANYL CITRATE 50 UG/ML
INJECTION, SOLUTION INTRAMUSCULAR; INTRAVENOUS AS NEEDED
Status: DISCONTINUED | OUTPATIENT
Start: 2025-02-05 | End: 2025-02-05

## 2025-02-05 RX ORDER — ALBUTEROL SULFATE 0.83 MG/ML
2.5 SOLUTION RESPIRATORY (INHALATION) ONCE AS NEEDED
Status: DISCONTINUED | OUTPATIENT
Start: 2025-02-05 | End: 2025-02-05 | Stop reason: HOSPADM

## 2025-02-05 RX ORDER — FENTANYL CITRATE 50 UG/ML
50 INJECTION, SOLUTION INTRAMUSCULAR; INTRAVENOUS EVERY 5 MIN PRN
Status: DISCONTINUED | OUTPATIENT
Start: 2025-02-05 | End: 2025-02-05 | Stop reason: HOSPADM

## 2025-02-05 RX ORDER — ONDANSETRON HYDROCHLORIDE 2 MG/ML
INJECTION, SOLUTION INTRAVENOUS AS NEEDED
Status: DISCONTINUED | OUTPATIENT
Start: 2025-02-05 | End: 2025-02-05

## 2025-02-05 RX ORDER — ACETAMINOPHEN 325 MG/1
650 TABLET ORAL EVERY 4 HOURS PRN
Status: DISCONTINUED | OUTPATIENT
Start: 2025-02-05 | End: 2025-02-05 | Stop reason: HOSPADM

## 2025-02-05 RX ORDER — SODIUM CHLORIDE 0.9 G/100ML
INJECTION, SOLUTION IRRIGATION AS NEEDED
Status: DISCONTINUED | OUTPATIENT
Start: 2025-02-05 | End: 2025-02-05 | Stop reason: HOSPADM

## 2025-02-05 RX ORDER — LIDOCAINE HYDROCHLORIDE AND EPINEPHRINE 10; 10 UG/ML; MG/ML
INJECTION, SOLUTION INFILTRATION; PERINEURAL AS NEEDED
Status: DISCONTINUED | OUTPATIENT
Start: 2025-02-05 | End: 2025-02-05 | Stop reason: HOSPADM

## 2025-02-05 RX ORDER — LIDOCAINE HYDROCHLORIDE 20 MG/ML
INJECTION, SOLUTION INFILTRATION; PERINEURAL AS NEEDED
Status: DISCONTINUED | OUTPATIENT
Start: 2025-02-05 | End: 2025-02-05

## 2025-02-05 RX ORDER — AMOXICILLIN 500 MG/1
500 CAPSULE ORAL EVERY 8 HOURS SCHEDULED
Qty: 15 CAPSULE | Refills: 0 | Status: SHIPPED | OUTPATIENT
Start: 2025-02-05 | End: 2025-02-10

## 2025-02-05 RX ORDER — LABETALOL HYDROCHLORIDE 5 MG/ML
5 INJECTION, SOLUTION INTRAVENOUS ONCE AS NEEDED
Status: DISCONTINUED | OUTPATIENT
Start: 2025-02-05 | End: 2025-02-05 | Stop reason: HOSPADM

## 2025-02-05 RX ORDER — CEFAZOLIN SODIUM 2 G/50ML
2 SOLUTION INTRAVENOUS ONCE
Status: DISCONTINUED | OUTPATIENT
Start: 2025-02-05 | End: 2025-02-05 | Stop reason: HOSPADM

## 2025-02-05 RX ORDER — GLYCOPYRROLATE 0.2 MG/ML
INJECTION INTRAMUSCULAR; INTRAVENOUS AS NEEDED
Status: DISCONTINUED | OUTPATIENT
Start: 2025-02-05 | End: 2025-02-05

## 2025-02-05 RX ORDER — OFLOXACIN 3 MG/ML
4 SOLUTION AURICULAR (OTIC) 2 TIMES DAILY
Qty: 10 ML | Refills: 0 | Status: SHIPPED | OUTPATIENT
Start: 2025-02-05

## 2025-02-05 RX ORDER — FENTANYL CITRATE 50 UG/ML
25 INJECTION, SOLUTION INTRAMUSCULAR; INTRAVENOUS EVERY 5 MIN PRN
Status: DISCONTINUED | OUTPATIENT
Start: 2025-02-05 | End: 2025-02-05 | Stop reason: HOSPADM

## 2025-02-05 RX ADMIN — PHENYLEPHRINE HYDROCHLORIDE 80 MCG: 10 INJECTION INTRAVENOUS at 11:36

## 2025-02-05 RX ADMIN — LIDOCAINE HYDROCHLORIDE 100 MG: 20 INJECTION, SOLUTION INFILTRATION; PERINEURAL at 10:33

## 2025-02-05 RX ADMIN — PHENYLEPHRINE HYDROCHLORIDE 80 MCG: 10 INJECTION INTRAVENOUS at 11:51

## 2025-02-05 RX ADMIN — PHENYLEPHRINE HYDROCHLORIDE 80 MCG: 10 INJECTION INTRAVENOUS at 11:57

## 2025-02-05 RX ADMIN — PHENYLEPHRINE HYDROCHLORIDE 80 MCG: 10 INJECTION INTRAVENOUS at 12:03

## 2025-02-05 RX ADMIN — OXYCODONE HYDROCHLORIDE 5 MG: 5 TABLET ORAL at 13:00

## 2025-02-05 RX ADMIN — PHENYLEPHRINE HYDROCHLORIDE 80 MCG: 10 INJECTION INTRAVENOUS at 11:14

## 2025-02-05 RX ADMIN — GLYCOPYRROLATE 0.2 MG: 0.2 INJECTION INTRAMUSCULAR; INTRAVENOUS at 11:30

## 2025-02-05 RX ADMIN — DEXAMETHASONE SODIUM PHOSPHATE 10 MG: 4 INJECTION INTRA-ARTICULAR; INTRALESIONAL; INTRAMUSCULAR; INTRAVENOUS; SOFT TISSUE at 10:35

## 2025-02-05 RX ADMIN — MIDAZOLAM 2 MG: 1 INJECTION INTRAMUSCULAR; INTRAVENOUS at 10:25

## 2025-02-05 RX ADMIN — PHENYLEPHRINE HYDROCHLORIDE 80 MCG: 10 INJECTION INTRAVENOUS at 11:03

## 2025-02-05 RX ADMIN — SUCCINYLCHOLINE CHLORIDE 170 MG: 20 INJECTION, SOLUTION INTRAMUSCULAR; INTRAVENOUS at 10:34

## 2025-02-05 RX ADMIN — CEFAZOLIN 2 G: 1 INJECTION, POWDER, FOR SOLUTION INTRAMUSCULAR; INTRAVENOUS at 10:35

## 2025-02-05 RX ADMIN — DEXMEDETOMIDINE HYDROCHLORIDE 0.2 MCG/KG/HR: 4 INJECTION, SOLUTION INTRAVENOUS at 10:45

## 2025-02-05 RX ADMIN — PHENYLEPHRINE HYDROCHLORIDE 80 MCG: 10 INJECTION INTRAVENOUS at 11:18

## 2025-02-05 RX ADMIN — PROPOFOL 200 MG: 10 INJECTION, EMULSION INTRAVENOUS at 10:33

## 2025-02-05 RX ADMIN — ONDANSETRON 4 MG: 2 INJECTION INTRAMUSCULAR; INTRAVENOUS at 11:14

## 2025-02-05 RX ADMIN — PHENYLEPHRINE HYDROCHLORIDE 80 MCG: 10 INJECTION INTRAVENOUS at 11:30

## 2025-02-05 RX ADMIN — PHENYLEPHRINE HYDROCHLORIDE 80 MCG: 10 INJECTION INTRAVENOUS at 11:42

## 2025-02-05 RX ADMIN — REMIFENTANIL HYDROCHLORIDE 0.05 MCG/KG/MIN: 1 INJECTION, POWDER, LYOPHILIZED, FOR SOLUTION INTRAVENOUS at 10:45

## 2025-02-05 RX ADMIN — SODIUM CHLORIDE, POTASSIUM CHLORIDE, SODIUM LACTATE AND CALCIUM CHLORIDE: 600; 310; 30; 20 INJECTION, SOLUTION INTRAVENOUS at 10:33

## 2025-02-05 RX ADMIN — FENTANYL CITRATE 100 MCG: 0.05 INJECTION, SOLUTION INTRAMUSCULAR; INTRAVENOUS at 10:33

## 2025-02-05 SDOH — HEALTH STABILITY: MENTAL HEALTH: CURRENT SMOKER: 0

## 2025-02-05 ASSESSMENT — PAIN - FUNCTIONAL ASSESSMENT
PAIN_FUNCTIONAL_ASSESSMENT: 0-10

## 2025-02-05 ASSESSMENT — PAIN SCALES - GENERAL
PAINLEVEL_OUTOF10: 5 - MODERATE PAIN
PAINLEVEL_OUTOF10: 0 - NO PAIN
PAINLEVEL_OUTOF10: 0 - NO PAIN
PAIN_LEVEL: 0
PAINLEVEL_OUTOF10: 0 - NO PAIN
PAINLEVEL_OUTOF10: 5 - MODERATE PAIN

## 2025-02-05 NOTE — DISCHARGE INSTRUCTIONS
.PATIENT INSTRUCTIONS FOR:   STAPEDOTOMY    A stapedotomy or stapedectomy is a procedure used to treat otosclerosis. Otosclerosis is a bone disease isolated to the middle ear space that causes fixation of the third bone of hearing (i.e. stapes). When the stapes is fixed it cannot properly conduct sound from the eardrum into the inner ear. The procedure involves removing a portion of the stapes and replacing it with a prosthesis to restore hearing. This is an outpatient surgery, meaning you will go home the same day. Recovery from surgery generally takes 1-2 weeks. Hearing benefit can sometimes take even longer as the ear needs time to heal.     Dr. Pa has worked with the national Academy of Otolaryng[employer] - Head [employer] Neck [employer] to develop a patient friendly website for common Ear, Nose, and Throat conditions. He authored the chapter on otosclerosis and stapes surgery. If you are interested in additional details on the topic please visit: www.[facility].org or [email or web address]     MEDICATIONS  Ear drops: A topical antibiotic ear drop will be prescribed. This is to dissolve packing in your ear canal and prevent infection while everything heals. Start the ear drop 1 week after surgery and continue them until your first follow-up appointment. The doctor will then advise if further use is needed.     Antibiotic: An oral antibiotic will also be prescribed to help prevent postoperative infection. Start the antibiotic the evening you go home from surgery and take until the prescription is finished, typically 5-days. Avoid taking on an empty stomach.     Pain medication: Narcotic pain medication is not always needed after ear surgery, but is often prescribed. For adults, consider a non-narcotic pain regimen of alternating ibuprofen 800 mg every 4 hours and acetaminophen 500-1000 mg every 6 hours. Do not exceed 4000 mg of acetaminophen per day. If a narcotic is needed, please take as  prescribed and only as needed. Do not drive or operate machinery while using narcotics. Avoid taking pain medication on an empty stomach.     AFTER-CARE  Incisional Care:   1) Ear canal: Incisions were made in your ear canal. They secured with packing.  It is important to keep the ear canal dry, aside from the antibiotic drops that you will start the week after surgery. It  is ok to shower 24-hours after surgery. When showering, coat a cotton ball with Vaseline and place that in the ear canal to keep it dry. After the shower, remove the Vaseline coated cotton ball and replace it with a fresh, dry cotton ball.  It is normal to have some bloody ooze and pain during the first week after the procedure. If there is bleeding from the ear canal, the cotton ball may need to replaced more frequently (2-5 times a day). Please call the office if the ear canal bleeding is excessive or concerning. There may also be an incision at the front of the ear canal where the cartilage graft was taken. These sutures will dissolve on their own and no additional care for that incision is needed. Do not pick, pull, rub or scratch your incision. Always wash your hands before and after contact with incision.  Please keep a fresh cotton ball in your ear until your 1st post op visit.    2) Behind the ear: You can ignore this section if an incision behind the ear was not performed. Remove the ear wrap (mastoid dressing) the day after your surgery. It can then be left off, but some patients like to sleep with it on the first week after surgery. Your incision is closed with sutures under the skin that will dissolve on their own. The outer layer of skin is closed with skin glue (i.e. liquid Band-Aid). If  the incision is dry (no bleeding), then just leave the skin glue in place. If there is mild bleeding through the skin glue, it can be cleaned with hydrogen peroxide and covered with an over-the-counter antibiotic ointment or Vaseline.  Hold  pressure  if the bleeding persists. Call the office if there are any concerns. It is ok to shower 24-hours after surgery. If some water gets in contact with the incision/skin glue it is ok, but try to avoid soaking the incision.     Other:  You may experience some soreness when chewing, tinnitus, and even hear/experience a crackling noise as the ear heals.     You may experience temporary taste changes or altered taste.    It is not unusual to experience dizziness for a couple days following an ear surgery. Call the office if this fails to improve by 3-4 days following surgery or if the vertigo is severe.     Avoid lying on the operative ear for 1 week after surgery. No strenuous activity for 10 days after surgery, including no heavy lifting over 10 lbs. Do not travel on an airplane for 4 weeks, unless otherwise approved. Avoid situations where you might have to make sudden head movements. Do not blow your nose or sneeze with your mouth closed. Try not to blow your nose for at least 1 week after surgery. If you use CPAP, ask your doctor about when it is safe to resume its use. Call the office if you have a temperature over 100.3, your incision is red, swollen or coming apart, or if you have excessive drainage from your ear or incision.    Do not wait until your appointment to report any problems or questions. The office number is [phone] and ask to speak with Dr. Pa's nurse. For urgent concerns after hours or on weekends: Call [phone] or the [facility]  ([phone]) and ask for the on-call otolaryngology/ENT resident.     FOLLOW-UP SCHEDULE  The typical stapedotomy patient care path is as follows:   3-4 weeks post-op: 1st follow-up appointment, removal of ear canal packing, discuss your healing course, and review the surgical details   3-4 months post-op: audiogram, review your healing course, and counseling on hearing rehabilitation if needed   12 months post-op: audiogram, ear  examination, and discuss future monitoring needs   Annual visits depending on your hearing status

## 2025-02-05 NOTE — OP NOTE
Patient: Jaime Garcia  MRN: [id number]  : 1975    DATE OF PROCEDURE: 2025    PREOPERATIVE DIAGNOSIS: right mixed hearing loss, otosclerosis  POSTOPERATIVE DIAGNOSIS: same     PROCEDURES:   1) right transcanal endoscopic stapedotomy with CO2 laser (CPT 91641)    SURGEON: Arnie Pa MD     RESIDENT PHYSICIAN: Samantha Todd MD     ANESTHESIA: General  ESTIMATED BLOOD LOSS: 3 mL.   COMPLICATIONS: None.  SPECIMENS: None  CONDITION: Stable to the post-anesthesia care unit.     INDICATIONS FOR PROCEDURE: Jaime Garcia is a 49 y.o.-year-old male with progressive conductive hearing loss. Exam showed no evidence of middle ear disease. The diagnosis was clinically consistent with otosclerosis. Treatment options were discussed including observation, amplification, and surgical intervention. The patient has opted to proceed with surgery.    INTRAOPERATIVE FINDINGS:   Oval Window Openin.7 mm       * Obliterative/thick footplate  Prosthesis: [employer] Medical Eclipse Piston - 4.5 mm x 0.6 m  Graft: n/a  Tympanic Membrane: intact  Facial nerve dehiscence: no dehiscence  Chorda tympani nerve: intact    PROCEDURE IN DETAIL:   Consent was obtained and the operative ear was marked. The patient was transported to the operating room and placed  supine on the operating table. A timeout was performed. General anesthesia was induced and the patient was intubated without complication. The bed was turned 180-degrees and the patient was secured with straps. Facial nerve monitoring was setup and confirmed to be working. The operative ear was prepped in a sterile fashion with betadine paint and draped for otologic surgery. A pre-surgical pause was performed to confirm correct side and procedure.     A 0-degree, 3-mm diameter, 14-cm length endoscope was inserted into the ear canal and the tympanic membrane well visualized and noted to be healthy and intact. The canal was injected with 1% lidocaine with 1:100,000  epinephrine. All needles were removed from the field. Canal hairs were trimmed. Canal incisions were made and a tympanomeatal flap was elevated until the annulus was identified. Hemostasis was obtained with quarter-inch cottonoids soaked in a topical vasoconstrictor. The middle ear was entered. The malleus and incus were palpated and had normal mobility. The stapes was palpated and confirmed to be fixed. Curetting was performed to ensure good visualization of the pyramidal eminence, facial nerve, and oval window niche.    Using a CO2 laser on a setting of 4 patton, the stapedial tendon was cut. Then the laser was used to divide the posterior crura. Next, a Thompson needle was used to down fracture the stapes superstructure. The stapes footplate was visualized. The distance from the incus to the footplate was measured. The laser was adjusted to 2 patton and a zuleyka pattern was formed. Hand held perforators were then used to serially open the stapedotomy to approximately 0.1 mm larger than the diameter of the the prosthesis. The prosthesis was then atraumatically positioned onto the long process of the incus. When it was in the correct position, the laser was used to seal the Nitonol prosthesis onto the incus. Care was taken to avoid excessive lasering of the incus. Appropriate prosthesis mobility was confirmed at the stapedotomy site. The promontory mucosa was scrapped to form a blood patch. The tympanomeatal flap was positioned into its anatomic position and secured with antibiotic soaked gelfoam. A cotton ball with ointment was placed in the canal. The patient was awakened, extubated, and brought to PACU without complication. All counts were confirmed to be correct prior to closure. I was present and participated in all portions of the procedure.    Arnie Pa M.D.            Otology/Neurotology      Department of Otolaryngology - Head and Neck Surgery     [facility]  [facility]     Phone/Appointments: [phone]      Fax: [phone]      E-mail: [email or web address]

## 2025-02-05 NOTE — ANESTHESIA PROCEDURE NOTES
Airway  Date/Time: 2/5/2025 10:37 AM  Urgency: elective    Airway not difficult    Staffing  Performed: BIANCA   Authorized by: Juan Manuel Mccrary MD    Performed by: BIANCA Hartley  Patient location during procedure: OR    Indications and Patient Condition  Indications for airway management: anesthesia  Spontaneous Ventilation: absent  Sedation level: deep  Preoxygenated: yes  Patient position: sniffing  Mask difficulty assessment: 2 - vent by mask + OA or adjuvant +/- NMBA  Planned trial extubation    Final Airway Details  Final airway type: endotracheal airway      Successful airway: ETT  Cuffed: yes   Successful intubation technique: direct laryngoscopy  Endotracheal tube insertion site: oral  Blade: Hernesto  Blade size: #4  ETT size (mm): 7.5  Cormack-Lehane Classification: grade IIa - partial view of glottis  Placement verified by: chest auscultation and capnometry   Inital cuff pressure (cm H2O): 20  Measured from: lips  ETT to lips (cm): 23  Number of attempts at approach: 1          
Adenoidectomy

## 2025-02-05 NOTE — ANESTHESIA POSTPROCEDURE EVALUATION
Patient: Jaime Garcia    Procedure Summary       Date: 02/05/25 [place] / [place] [facility] [place] [facility] [place] [facility] [place] [facility] [place] [facility] [place]    Anesthesia Start: 1025 Anesthesia Stop: 1222    Procedure: RIGHT SIDE ENDOSCOPIC STAPEDECTOMY WITH CO2 LASER (Right) Diagnosis:       Mixed hearing loss, bilateral      Otosclerosis of both ears      (Mixed hearing loss, bilateral [H90.6])      (Otosclerosis of both ears [H80.93])    Surgeons: Arnie Pa MD Responsible Provider: Juan Manuel Mccrary MD    Anesthesia Type: general ASA Status: 3            Anesthesia Type: general    Vitals Value Taken Time   /72 02/05/25 1230   Temp 36 °C (96.8 °F) 02/05/25 1218   Pulse 99 02/05/25 1230   Resp 18 02/05/25 1230   SpO2 95 % 02/05/25 1230       Anesthesia Post Evaluation    Patient location during evaluation: PACU  Patient participation: complete - patient participated  Level of consciousness: awake and alert  Pain score: 0  Pain management: adequate  Multimodal analgesia pain management approach  Airway patency: patent  Two or more strategies used to mitigate risk of obstructive sleep apnea  Cardiovascular status: acceptable  Respiratory status: acceptable  Hydration status: acceptable  Postoperative Nausea and Vomiting: none    No notable events documented.

## 2025-02-05 NOTE — INTERVAL H&P NOTE
H&P reviewed. The patient was examined and there are no changes to the H&P.    - Right endoscopic stapedectomy with laser

## 2025-02-05 NOTE — ANESTHESIA PREPROCEDURE EVALUATION
Patient: Jaime Garcia    Procedure Information       Date/Time: 02/05/25 1130    Procedure: RIGHT SIDE ENDOSCOPIC STAPEDECTOMY WITH CO2 LASER (Right) - Time for surgery: 90 min    Location: [facility] [place] [facility] 01 / Virtual [facility] [place] [facility]    Surgeons: Arnie Pa MD            Relevant Problems   Anesthesia (within normal limits)      Cardiac (within normal limits)      Pulmonary   (+) BEATRICE (obstructive sleep apnea)      Neuro (within normal limits)      GI   (+) PUD (peptic ulcer disease)      Endocrine   (+) Class 1 obesity due to excess calories without serious comorbidity with body mass index (BMI) of 34.0 to 34.9 in adult      Hematology   (+) Thrombocytopenia (CMS-HCC)      HEENT   (+) Mixed hearing loss, bilateral       Clinical information reviewed:   Tobacco  Allergies  Meds  Problems  Med Hx  Surg Hx   Fam Hx  Soc   Hx        NPO Detail:  NPO/Void Status  Date of Last Liquid: 02/04/25  Time of Last Liquid: 2000  Date of Last Solid: 02/04/25  Time of Last Solid: 2000  Time of Last Void: 0800         Physical Exam    Airway  Mallampati: I  TM distance: >3 FB  Neck ROM: full     Cardiovascular - normal exam     Dental - normal exam     Pulmonary - normal exam     Abdominal - normal exam         Anesthesia Plan    History of general anesthesia?: yes  History of complications of general anesthesia?: no    ASA 3     general     The patient is not a current smoker.    intravenous induction   Postoperative administration of opioids is intended.  Anesthetic plan and risks discussed with patient.  Use of blood products discussed with patient who.    Plan discussed with CRNA and CAA.

## 2025-02-06 ENCOUNTER — HOSPITAL ENCOUNTER (EMERGENCY)
Facility: HOSPITAL | Age: 50
Discharge: HOME | End: 2025-02-06
Attending: STUDENT IN AN ORGANIZED HEALTH CARE EDUCATION/TRAINING PROGRAM
Payer: COMMERCIAL

## 2025-02-06 ENCOUNTER — DOCUMENTATION (OUTPATIENT)
Dept: OTOLARYNGOLOGY | Facility: HOSPITAL | Age: 50
End: 2025-02-06
Payer: COMMERCIAL

## 2025-02-06 ENCOUNTER — TELEPHONE (OUTPATIENT)
Facility: CLINIC | Age: 50
End: 2025-02-06
Payer: COMMERCIAL

## 2025-02-06 ENCOUNTER — APPOINTMENT (OUTPATIENT)
Dept: CARDIOLOGY | Facility: HOSPITAL | Age: 50
End: 2025-02-06
Payer: COMMERCIAL

## 2025-02-06 VITALS
HEIGHT: 68 IN | WEIGHT: 230 LBS | SYSTOLIC BLOOD PRESSURE: 122 MMHG | TEMPERATURE: 98.1 F | HEART RATE: 68 BPM | DIASTOLIC BLOOD PRESSURE: 74 MMHG | OXYGEN SATURATION: 94 % | RESPIRATION RATE: 18 BRPM | BODY MASS INDEX: 34.86 KG/M2

## 2025-02-06 DIAGNOSIS — M62.838 MUSCLE SPASM: Primary | ICD-10-CM

## 2025-02-06 LAB
ALBUMIN SERPL BCP-MCNC: 4.4 G/DL (ref 3.4–5)
ALP SERPL-CCNC: 57 U/L (ref 33–120)
ALT SERPL W P-5'-P-CCNC: 31 U/L (ref 10–52)
ANION GAP SERPL CALC-SCNC: 13 MMOL/L (ref 10–20)
AST SERPL W P-5'-P-CCNC: 18 U/L (ref 9–39)
BASOPHILS # BLD AUTO: 0.04 X10*3/UL (ref 0–0.1)
BASOPHILS NFR BLD AUTO: 0.5 %
BILIRUB SERPL-MCNC: 0.4 MG/DL (ref 0–1.2)
BUN SERPL-MCNC: 13 MG/DL (ref 6–23)
CALCIUM SERPL-MCNC: 9.3 MG/DL (ref 8.6–10.3)
CHLORIDE SERPL-SCNC: 105 MMOL/L (ref 98–107)
CO2 SERPL-SCNC: 24 MMOL/L (ref 21–32)
CREAT SERPL-MCNC: 1.01 MG/DL (ref 0.5–1.3)
EGFRCR SERPLBLD CKD-EPI 2021: 90 ML/MIN/1.73M*2
EOSINOPHIL # BLD AUTO: 0.06 X10*3/UL (ref 0–0.7)
EOSINOPHIL NFR BLD AUTO: 0.7 %
ERYTHROCYTE [DISTWIDTH] IN BLOOD BY AUTOMATED COUNT: 13 % (ref 11.5–14.5)
GLUCOSE SERPL-MCNC: 98 MG/DL (ref 74–99)
HCT VFR BLD AUTO: 44.3 % (ref 41–52)
HGB BLD-MCNC: 15.2 G/DL (ref 13.5–17.5)
IMM GRANULOCYTES # BLD AUTO: 0.03 X10*3/UL (ref 0–0.7)
IMM GRANULOCYTES NFR BLD AUTO: 0.3 % (ref 0–0.9)
LYMPHOCYTES # BLD AUTO: 1.67 X10*3/UL (ref 1.2–4.8)
LYMPHOCYTES NFR BLD AUTO: 19.2 %
MAGNESIUM SERPL-MCNC: 1.87 MG/DL (ref 1.6–2.4)
MCH RBC QN AUTO: 30.4 PG (ref 26–34)
MCHC RBC AUTO-ENTMCNC: 34.3 G/DL (ref 32–36)
MCV RBC AUTO: 89 FL (ref 80–100)
MONOCYTES # BLD AUTO: 0.83 X10*3/UL (ref 0.1–1)
MONOCYTES NFR BLD AUTO: 9.5 %
NEUTROPHILS # BLD AUTO: 6.08 X10*3/UL (ref 1.2–7.7)
NEUTROPHILS NFR BLD AUTO: 69.8 %
NRBC BLD-RTO: 0 /100 WBCS (ref 0–0)
PLATELET # BLD AUTO: 126 X10*3/UL (ref 150–450)
POTASSIUM SERPL-SCNC: 3.8 MMOL/L (ref 3.5–5.3)
PROT SERPL-MCNC: 7.4 G/DL (ref 6.4–8.2)
RBC # BLD AUTO: 5 X10*6/UL (ref 4.5–5.9)
SODIUM SERPL-SCNC: 138 MMOL/L (ref 136–145)
WBC # BLD AUTO: 8.7 X10*3/UL (ref 4.4–11.3)

## 2025-02-06 PROCEDURE — 36415 COLL VENOUS BLD VENIPUNCTURE: CPT | Performed by: NURSE PRACTITIONER

## 2025-02-06 PROCEDURE — 99284 EMERGENCY DEPT VISIT MOD MDM: CPT | Performed by: STUDENT IN AN ORGANIZED HEALTH CARE EDUCATION/TRAINING PROGRAM

## 2025-02-06 PROCEDURE — 83735 ASSAY OF MAGNESIUM: CPT | Performed by: NURSE PRACTITIONER

## 2025-02-06 PROCEDURE — 85025 COMPLETE CBC W/AUTO DIFF WBC: CPT | Performed by: NURSE PRACTITIONER

## 2025-02-06 PROCEDURE — 93005 ELECTROCARDIOGRAM TRACING: CPT

## 2025-02-06 PROCEDURE — 2500000004 HC RX 250 GENERAL PHARMACY W/ HCPCS (ALT 636 FOR OP/ED): Performed by: STUDENT IN AN ORGANIZED HEALTH CARE EDUCATION/TRAINING PROGRAM

## 2025-02-06 PROCEDURE — 80053 COMPREHEN METABOLIC PANEL: CPT | Performed by: NURSE PRACTITIONER

## 2025-02-06 RX ORDER — DIAZEPAM 5 MG/ML
5 INJECTION, SOLUTION INTRAMUSCULAR; INTRAVENOUS ONCE
Status: COMPLETED | OUTPATIENT
Start: 2025-02-06 | End: 2025-02-06

## 2025-02-06 RX ORDER — DIAZEPAM 5 MG/1
5 TABLET ORAL EVERY 8 HOURS PRN
Qty: 5 TABLET | Refills: 0 | Status: SHIPPED | OUTPATIENT
Start: 2025-02-06

## 2025-02-06 RX ADMIN — DIAZEPAM 5 MG: 10 INJECTION, SOLUTION INTRAMUSCULAR; INTRAVENOUS at 22:25

## 2025-02-06 ASSESSMENT — COLUMBIA-SUICIDE SEVERITY RATING SCALE - C-SSRS
1. IN THE PAST MONTH, HAVE YOU WISHED YOU WERE DEAD OR WISHED YOU COULD GO TO SLEEP AND NOT WAKE UP?: NO
6. HAVE YOU EVER DONE ANYTHING, STARTED TO DO ANYTHING, OR PREPARED TO DO ANYTHING TO END YOUR LIFE?: NO
2. HAVE YOU ACTUALLY HAD ANY THOUGHTS OF KILLING YOURSELF?: NO

## 2025-02-06 ASSESSMENT — PAIN SCALES - GENERAL
PAINLEVEL_OUTOF10: 0 - NO PAIN
PAINLEVEL_OUTOF10: 0 - NO PAIN

## 2025-02-06 ASSESSMENT — LIFESTYLE VARIABLES
EVER FELT BAD OR GUILTY ABOUT YOUR DRINKING: NO
HAVE YOU EVER FELT YOU SHOULD CUT DOWN ON YOUR DRINKING: NO
HAVE PEOPLE ANNOYED YOU BY CRITICIZING YOUR DRINKING: NO
EVER HAD A DRINK FIRST THING IN THE MORNING TO STEADY YOUR NERVES TO GET RID OF A HANGOVER: NO
TOTAL SCORE: 0

## 2025-02-06 ASSESSMENT — PAIN - FUNCTIONAL ASSESSMENT: PAIN_FUNCTIONAL_ASSESSMENT: 0-10

## 2025-02-06 NOTE — TELEPHONE ENCOUNTER
Jaime called concerned with having body aches following surgery yesterday. Reassured patient the pain could be from being on the operating table for a couple hours. Patient otherwise said he is feeling well after surgery, stated he is able to hear out of the surgical ear now.

## 2025-02-07 NOTE — ED TRIAGE NOTES
Pt states he has lopez having episodes of his body tensing up all day. Had procedure on ear yesterday. No airway issues or oral swelling noted. Pt able to manage own airway and secretions. Went to [facility] and left due to waiting too long. Vitals stable in triage. Pt's face appears red but no fevers. States that he talked to an ON-call nurse who stated it might be a reaction to the anesthesia.

## 2025-02-07 NOTE — ED TRIAGE NOTES
TRIAGE NOTE   I saw the patient as the Clinician in Triage and performed a brief history and physical exam, established acuity, and ordered appropriate tests to develop basic plan of care. Patient will be seen by an SUSIE, resident and/or physician who will independently evaluate the patient. Please see subsequent provider notes for further details and disposition.     Brief HPI: In brief, Jaime Garcia is a 49 y.o. male with significant PMH for conductive hearing loss s/p right sided endoscopic stapedectomy 2/5 per Dr. Pa of ENT presenting to ED today from home with wife for evaluation of episodes of muscle twitching/tension.  Patient is having episodes of muscle twitching/tensing that been intermittent throughout the day, nothing palliates or provokes his symptoms.  Concerned he may be having a reaction to anesthesia.  No swelling of the face lips or tongue.  No dysphagia.  Called nurse line and was advised to come to the ER for possible electrolyte derangement.  Presented initially to [facility] but the wait was too long so he came to [place] for evaluation.  Mild postop pain right ear.  Denies fever/chills, cough/cold symptoms, chest pain, shortness of breath, nausea/vomiting, abdominal pain, urinary symptoms, change in bowel habits or any other complaints.  Occasional EtOH, smoker, no IV drugs.  PCP is Dr. Camarillo.     Focused Physical exam:   49-year-old male, sitting in wheelchair, awake and alert, oriented x 3.  Well-nourished and hydrated.  Nontoxic looking.  Skin: Pink, warm and dry.  Cardiac: Regular rate and rhythm.  Pulmonary: Clear bilaterally.  EENT: Band-Aid over right EAC dry and intact.  No swelling of the face lips or tongue.  Posterior oropharynx is clear.  Mucous membranes slightly dry.  Neuro: Cranial nerves II through XII grossly intact.    Plan/MDM:    49 y.o. male with significant PMH for conductive hearing loss s/p right sided endoscopic stapedectomy 2/5 per Dr. Pa of ENT he is evaluated at the  bedside for intermittent episodes of muscle twitching and tensing that the patient noticed throughout the day, concern for reaction to anesthesia.  On arrival to the ED, vital signs within normal limits.  Afebrile.  Lungs clear, abdomen soft and nontender.  Neuro intact.  IV established, will check basic labs in preparation for further evaluation in the main ED.  Patient likely will need fluids.  Is agreeable to this plan.    Please see subsequent provider note for further details and disposition

## 2025-02-24 ENCOUNTER — APPOINTMENT (OUTPATIENT)
Dept: OTOLARYNGOLOGY | Facility: CLINIC | Age: 50
End: 2025-02-24
Payer: COMMERCIAL

## 2025-02-24 VITALS — BODY MASS INDEX: 34.56 KG/M2 | TEMPERATURE: 98.6 F | WEIGHT: 228 LBS | HEIGHT: 68 IN

## 2025-02-24 DIAGNOSIS — H80.93 OTOSCLEROSIS OF BOTH EARS: Primary | ICD-10-CM

## 2025-02-24 DIAGNOSIS — H90.6 MIXED HEARING LOSS, BILATERAL: ICD-10-CM

## 2025-02-24 PROBLEM — D33.3 VESTIBULAR SCHWANNOMA (MULTI): Status: ACTIVE | Noted: 2025-02-24

## 2025-02-24 PROCEDURE — 3008F BODY MASS INDEX DOCD: CPT | Performed by: OTOLARYNGOLOGY

## 2025-02-24 PROCEDURE — 99024 POSTOP FOLLOW-UP VISIT: CPT | Performed by: OTOLARYNGOLOGY

## 2025-02-24 ASSESSMENT — PATIENT HEALTH QUESTIONNAIRE - PHQ9: 2. FEELING DOWN, DEPRESSED OR HOPELESS: NOT AT ALL

## 2025-02-24 NOTE — PROGRESS NOTES
Chief Complaint: hearing loss and pulsatile tinnitus  Referred by: Rosana Handy, APR*     Interval: Patient follows up after right stapes surgery. Thickened footplate noted. Doing well, hearing much improved.  Patient reports post-op problems with anesthesia; required trip to ED for muscle convulsions    Treatment History    2/5/25 - Right endoscopic stapedotomy       HISTORY OF PRESENT ILLNESS: This is a 50 yo male who presents today for hearing loss and pulsatile tinnitus. Notes from Rosana Handy reviewed. Imaging reviewed. Family history of aneurysm noted.     Pulsatile tinnitus: Reports constant whooshing noise in the left ear.  Dates back to perhaps 2017, at which time he suffered some head trauma.  No prior workup.  Nothing makes it better.  Patient is very bothered by it.  Recent imaging identified possible ophthalmic aneurysm.  Formal angiogram with neurosurgery is pending.    Hearing loss: Progressive, bilateral.  Right ear is worse.  Hearing loss is causing significant decrease in his quality of life and negatively impacts his work.  He is not particularly interested in hearing aids.  Unclear if there is a family history.  No major history of vertigo or dizziness.  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 He has had left pulsatile tinnitus constant since 2017.  He got sick the weekend after Thanksgiving. He coughed so hard he perforated the right ear drum. He feels he is in a tunnel now on that side. No real ear pain or drainage. Denies vertigo, did have some in the past.  Denies previous ear surgery. Admits to loud noise exposure. No known family history of hearing loss.     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 Take before meals., Disp: , Rfl:     ofloxacin (Floxin) 0.3 % otic solution, Administer 4 drops into the right ear 2 times a day. Use drops until your follow up appointment, Disp: 10 mL, Rfl: 0    diazePAM (Valium) 5 mg tablet, Take 1 tablet (5 mg) by mouth every 8 hours if needed for muscle spasms for up to 5 doses. (Patient not taking: Reported on 2/24/2025), Disp: 5 tablet, Rfl: 0    HYDROcodone-acetaminophen (Norco) 5-325 mg tablet, Take 1 tablet by mouth every 6 hours if needed for severe pain (7 - 10). (Patient not taking: Reported on 2/24/2025), Disp: 15 tablet, Rfl: 0    mupirocin (Bactroban) 2 % ointment, Apply a pea sized amount to the pad of the thumb, swipe into the nostril, sniff, then pinch the nose 2-3 times daily. Do NOT use a Q-tip. (Patient not taking: Reported on 2/24/2025), Disp: 30 g, Rfl: 0    polyethylene glycol-electrolytes (Golytely) 420 gram solution, Begin drinking first half of prep 6pm the night before procedure. Start second half 5 hours before procedure time. (Patient not taking: Reported on 12/11/2024), Disp: 4000 mL, Rfl: 0    Review of patient's allergies indicates:  No Known Allergies    Social History and Family History: reviewed in the EMR    New patients fill out a 10-system review of systems survey that gets reviewed at their initial visit. Pertinent positives have been incorporated into the HPI.    PHYSICAL EXAM:   Vitals:    02/24/25 0940   Temp: 37 °C (98.6 °F)   TempSrc: Temporal   Weight: 103 kg (228 lb)   Height: 1.727 m (5' 8\")       The patient is well-developed, well-nourished and in no acute distress.    The patient is alert and oriented to time, person, and place with appropriate mood and affect.     EARS: Examination of the external ears was normal using " visual inspection. Handheld otoscopy was inadequate to provide fine detail and depth perception necessary for a full diagnostic assessment of the tympanic membrane and middle ear space. The otologic microscope was utilized to improve visualization. Use of the otologic microscope facilitates cleaning of the EAC and three-dimensional, magnified visualization of the tympanic membrane and middle ear structures for diagnosis of observable pathology and anatomical variations. Otoscopic and/or microscopic evaluation reveals:        Right:  External auditory canal: patent  Tympanic Membrane: intact  Middle ear: well aerated       Left: External auditory canal: patent   Tympanic Membrane: intact and normal   Middle ear: well aerated     512-Hz Tuning Fork Exam   Traore: left  Rinne: Right - AC > BC; Left - AC < BC    Eyes:  EOMI, vision grossly intact, no nystagmus   Neurologic:  CN 2-12 intact including normal facial movement and sensation     DATA REVIEWED:   AUDIOGRAMS   Date - 07/2024   - reviewed, also shows bilateral mod-severe mixed hearing loss with large ABG   - absent acoustic reflexes    Date - 12/9/24      IMAGING  CT Temporal Bone   CTA Head and Neck   - both studies reviewed  CT ANGIOGRAPHY HEAD/NECK:  1. No clear vascular malformation in the posterior fossa to explain  the clinical complaint of tinnitus.  2. Subtle 2 mm outpouching observed of the left C6 (ophthalmic)  segment of the internal carotid artery, somewhat limited in  visualization secondary to adjacent osseous structures. Oblique  reformations suggestive of possible aneurysm versus ophthalmic artery  infundibulum. Catheter angiography may be of further used to assess  this lesion. Otherwise, unremarkable angiographic assessment of the  great vessels of the head.  CT TEMPORAL BONE  IMPRESSION:  1. Soft tissue within the right tympanic cavity as well as right  mastoid effusion without associated soft tissue mass or osseous  erosion. Findings may  represent sequela of otomastoiditis. No  definitive evidence of cholesteatoma.  2. Left temporal bone is unremarkable.  3. Unerupted right maxillary molar with extension of large mixed  osseous and soft tissue bed extending into the right maxillary sinus.    OTHER  - none    PROCEDURE:    ASSESSMENT & PLAN:  Problem List Items Addressed This Visit          ENT    Mixed hearing loss, bilateral    Otosclerosis of both ears - Primary     Doing well after right stapes surgery.  - audiogram in 3-months    Anticipate eventual left stapes surgery. Patient may need to wait until later 2025 or 2026    Arnie Pa M.D.      Otology/Neurotology   Department of Otolaryngology - Head and Neck Surgery  [facility]  Phone/Appointments: [phone]   Fax: [phone]   E-mail: [email or web address]

## 2025-03-20 ENCOUNTER — APPOINTMENT (OUTPATIENT)
Dept: NEUROLOGY | Facility: CLINIC | Age: 50
End: 2025-03-20
Payer: COMMERCIAL

## 2025-05-12 ENCOUNTER — OFFICE VISIT (OUTPATIENT)
Dept: URGENT CARE | Age: 50
End: 2025-05-12
Payer: COMMERCIAL

## 2025-05-12 VITALS
HEART RATE: 68 BPM | OXYGEN SATURATION: 94 % | HEIGHT: 68 IN | BODY MASS INDEX: 34.67 KG/M2 | TEMPERATURE: 98.1 F | DIASTOLIC BLOOD PRESSURE: 69 MMHG | SYSTOLIC BLOOD PRESSURE: 100 MMHG | RESPIRATION RATE: 16 BRPM

## 2025-05-12 DIAGNOSIS — B34.8 RHINOVIRUS INFECTION: Primary | ICD-10-CM

## 2025-05-12 DIAGNOSIS — R05.1 ACUTE COUGH: ICD-10-CM

## 2025-05-12 DIAGNOSIS — R09.82 POST-NASAL DRIP: ICD-10-CM

## 2025-05-12 LAB
POC CORONAVIRUS SARS-COV-2 PCR: NEGATIVE
POC HUMAN RHINOVIRUS PCR: POSITIVE
POC INFLUENZA A VIRUS PCR: NEGATIVE
POC INFLUENZA B VIRUS PCR: NEGATIVE
POC RESPIRATORY SYNCYTIAL VIRUS PCR: NEGATIVE

## 2025-05-12 PROCEDURE — 99213 OFFICE O/P EST LOW 20 MIN: CPT

## 2025-05-12 PROCEDURE — 87631 RESP VIRUS 3-5 TARGETS: CPT

## 2025-05-12 RX ORDER — BENZONATATE 100 MG/1
100 CAPSULE ORAL 3 TIMES DAILY PRN
Qty: 20 CAPSULE | Refills: 0 | Status: SHIPPED | OUTPATIENT
Start: 2025-05-12 | End: 2025-05-19

## 2025-05-12 ASSESSMENT — ENCOUNTER SYMPTOMS
FATIGUE: 1
COUGH: 1

## 2025-05-12 NOTE — PROGRESS NOTES
"Subjective   Patient ID: Jaime Garcia is a 50 y.o. male. They present today with a chief complaint of Sinusitis and Fatigue (Post nasal drip, mucus, fatigue x 3 days.).    History of Present Illness  Patient is a 50-year-old male with history of peptic ulcer disease, obstructive sleep apnea and thrombocytopenia who presents urgent care today with a complaint of cold symptoms.  He states his symptoms started approximately 3 days ago.  Specifically he endorses postnasal drip, heavy mucus production and fatigue.  He has been using over-the-counter medication without significant relief.  He denies any chest pain or shortness of breath.  No other complaints or concerns much at this time.      History provided by:  Patient  Sinusitis  Associated symptoms: cough and fatigue    Fatigue  Associated symptoms: cough and fatigue        Past Medical History  Allergies as of 05/12/2025    (No Known Allergies)       Prescriptions Prior to Admission[1]       Medical History[2]    Surgical History[3]     reports that he has quit smoking. His smoking use included cigarettes. He started smoking about 34 years ago. He has a 24.1 pack-year smoking history. He has never used smokeless tobacco. He reports current alcohol use of about 20.0 standard drinks of alcohol per week. He reports current drug use. Frequency: 2.00 times per week. Drug: Marijuana.    Review of Systems  Review of Systems   Constitutional:  Positive for fatigue.   HENT:  Positive for postnasal drip.    Respiratory:  Positive for cough.                                   Objective    Vitals:    05/12/25 1329   BP: 100/69   BP Location: Left arm   Patient Position: Sitting   BP Cuff Size: Large adult   Pulse: 68   Resp: 16   Temp: 36.7 °C (98.1 °F)   TempSrc: Oral   SpO2: 94%   Height: 1.727 m (5' 8\")     No LMP for male patient.    Physical Exam  Vitals and nursing note reviewed.   Constitutional:       General: He is not in acute distress.     Appearance: Normal " appearance. He is not ill-appearing, toxic-appearing or diaphoretic.   HENT:      Head: Normocephalic and atraumatic.      Right Ear: Tympanic membrane, ear canal and external ear normal.      Left Ear: Tympanic membrane, ear canal and external ear normal.      Nose: Congestion present.      Mouth/Throat:      Mouth: Mucous membranes are moist.      Pharynx: Posterior oropharyngeal erythema present. No oropharyngeal exudate.   Eyes:      Extraocular Movements: Extraocular movements intact.      Conjunctiva/sclera: Conjunctivae normal.      Pupils: Pupils are equal, round, and reactive to light.   Cardiovascular:      Rate and Rhythm: Normal rate and regular rhythm.      Pulses: Normal pulses.      Heart sounds: Normal heart sounds.   Pulmonary:      Effort: Pulmonary effort is normal. No respiratory distress.      Breath sounds: Normal breath sounds. No stridor. No wheezing, rhonchi or rales.   Chest:      Chest wall: No tenderness.   Musculoskeletal:         General: Normal range of motion.      Cervical back: Normal range of motion and neck supple.   Skin:     General: Skin is warm and dry.      Capillary Refill: Capillary refill takes less than 2 seconds.   Neurological:      General: No focal deficit present.      Mental Status: He is alert and oriented to person, place, and time.   Psychiatric:         Mood and Affect: Mood normal.         Behavior: Behavior normal.         Procedures      Assessment/Plan   Allergies, medications, history, and pertinent labs/EKGs/Imaging reviewed by KRISTOFER Jama.     Medical Decision Making    Patient is well appearing, afebrile, non toxic, not hypoxic, and appropriate for outpatient treatment and management at time of evaluation. Patient presents with ongoing cold symptoms x 3 days.     Differential includes but not limited to: COVID, influenza, rhinovirus, RSV, URI, other    Physical exam as described above.  Vitals within normal limits.  Oxygen saturation on  room air is 94%.  Lung sounds clear in all fields bilaterally.    COVID PCR is negative.  Rapid influenza A/B and RSV are negative.  Rhinovirus is positive.    Counseling: Spoke with the patient and discussed today´s findings, in addition to providing specific details for the plan of care and expected course.  Recommended continued use of over-the-counter medication as needed for symptom relief.  A short course of Tessalon Perles was called into his pharmacy use as directed.  Patient was given the opportunity to ask questions.     Discussed return precautions and importance of follow-up. Advised to follow-up with PCP.  We spoke at length regarding the red flags he/she should be aware of and monitor for.  I specifically advised to go to the ED for changing or worsening symptoms, new symptoms, complaint specific precautions, and precautions listed on the discharge paperwork.     The plan of care was mutually agreed upon with the patient. All questions and concerns were answered to the best of my ability with today's information.  Patient was discharged in stable condition.    Dictation software was used in the creation of this note which does not evaluate or correct for typographical, spelling, syntax or grammatical errors.    Orders and Diagnoses  Diagnoses and all orders for this visit:  Post-nasal drip  -     POCT SPOTFIRE R/ST Panel Mini w/COVID ([facility]) manually resulted      Medical Admin Record      Follow Up Instructions  No follow-ups on file.    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

## 2025-05-12 NOTE — PATIENT INSTRUCTIONS
You were seen at Urgent Care today and diagnosed with rhinovirus. Please treat as discussed. Please take medications as prescribed. Monitor for red flags which we spoke about, If your symptoms change, worsen or become concerning in any way, please go to the emergency room immediately, otherwise you can followup with your PCP in 2-3 days as needed

## (undated) DEVICE — CATHETER, IV, ANGIOCATH, 14 G X 1.16 IN, FEP POLYMER

## (undated) DEVICE — DRAPE, MICROSCOPE, W/REMOVABLE LENS

## (undated) DEVICE — 12MM MEDTRONIC PAIRED SUBDERMAL ELECTRODE, 2-CHANNEL, 12.0MM***DUPLICATE, PLEASE TRANSITION TO CAT #8227410

## (undated) DEVICE — DRAPE, TOWEL, STERI DRAPE, 17 X 11 IN, PLASTIC, STERILE

## (undated) DEVICE — DRAPE, INSTRUMENT, W/POUCH, STERI DRAPE, 7 X 11 IN, DISPOSABLE, STERILE

## (undated) DEVICE — NEEDLE, HYPODERMIC, MONOJECT, TRI-BEVELED, ANTI-CORING, 27 G X 1.25 IN, LUER LOCK HUB, YELLOW

## (undated) DEVICE — DRAPE, SHEET, W/APERTURE, SMALL, 16 X 16 IN, DISPOSABLE, STERILE

## (undated) DEVICE — SYRINGE, MONOJECT, LUER LOCK, 3 CC, LF

## (undated) DEVICE — Device

## (undated) DEVICE — TOWEL, SURGICAL, NEURO, O/R, 16 X 26, BLUE, STERILE

## (undated) DEVICE — CLEANER, WIPE, INSTRUMENT, 3.25 X 3.25 IN

## (undated) DEVICE — CONTAINER, SPECIMEN, 4 OZ, OR PEEL PACK, STERILE

## (undated) DEVICE — BLADE, TYPANOPLASTY, 60 DEG ANGLED, 2.5MM

## (undated) DEVICE — TUBING, SUCTION, OTOMED

## (undated) DEVICE — GLOVE, SURGICAL, PROTEXIS PI , 7.0, PF, LF

## (undated) DEVICE — BANDAGE, ADHESIVE, STRIP, FLEXIBLE 3/4 X 3 IN, LF

## (undated) DEVICE — GOWN, SURGICAL, REINFORCED, XLARGE, X-LONG, STERILE

## (undated) DEVICE — DRESSING, OTOPORE,STANDARD CYLINDER

## (undated) DEVICE — DRAPE, SURGICAL, OTOLOGY GLASSCOCK

## (undated) DEVICE — HOLSTER, JET SAFETY

## (undated) DEVICE — STOCKINETTE, IMPERVIOUS, 12 X 48 IN, LF, STERILE

## (undated) DEVICE — SUTURE, PLAIN, 5-0, 18 IN, PC1, YELLOW

## (undated) DEVICE — SYRINGE, 1 CC, LUER LOCK